# Patient Record
Sex: FEMALE | Race: WHITE | NOT HISPANIC OR LATINO | Employment: FULL TIME | URBAN - METROPOLITAN AREA
[De-identification: names, ages, dates, MRNs, and addresses within clinical notes are randomized per-mention and may not be internally consistent; named-entity substitution may affect disease eponyms.]

---

## 2017-01-18 ENCOUNTER — TELEPHONE (OUTPATIENT)
Dept: FAMILY MEDICINE | Facility: CLINIC | Age: 22
End: 2017-01-18

## 2017-01-18 DIAGNOSIS — Z30.41 ENCOUNTER FOR SURVEILLANCE OF CONTRACEPTIVE PILLS: Primary | ICD-10-CM

## 2017-01-18 NOTE — TELEPHONE ENCOUNTER
Left message for patient to return call to clinic.  Not due for a pappe until 10/2018, but could come in for a physical if wanting to keep appointment.  Can send refills to the pharmacy per protocol.  Last OV was 12/6/16.    Janis Jc RN

## 2017-01-18 NOTE — TELEPHONE ENCOUNTER
Pt calling stating she is completely out and wondering if she can get this filled today. She made an appt for a pappe next Wednesday.    norethindrone-ethinyl estradiol (NORTREL 1/35, 28,) 1-35 MG-MCG per tablet        Last Written Prescription Date: 12/06/16  Last Fill Quantity: 28, # refills: 0  Last Office Visit with [facility] or [facility] prescribing provider: 12/09/16   Next 5 appointments (look out 90 days)     Jan 25, 2017 10:40 AM   PHYSICAL with FARA Louie CNP   [facility]    [address] [address] [phone]                   BP Readings from Last 3 Encounters:   12/10/16 103/63   12/09/16 106/68   08/22/16 103/63     Date of last Breast Exam: /    Josey Parmar  [facility] [place]

## 2017-02-14 ENCOUNTER — OFFICE VISIT (OUTPATIENT)
Dept: FAMILY MEDICINE | Facility: CLINIC | Age: 22
End: 2017-02-14
Payer: COMMERCIAL

## 2017-02-14 VITALS
HEIGHT: 62 IN | SYSTOLIC BLOOD PRESSURE: 109 MMHG | BODY MASS INDEX: 22.36 KG/M2 | TEMPERATURE: 98 F | DIASTOLIC BLOOD PRESSURE: 63 MMHG | WEIGHT: 121.5 LBS | OXYGEN SATURATION: 99 % | HEART RATE: 67 BPM

## 2017-02-14 DIAGNOSIS — J01.00 ACUTE NON-RECURRENT MAXILLARY SINUSITIS: Primary | ICD-10-CM

## 2017-02-14 PROCEDURE — 99213 OFFICE O/P EST LOW 20 MIN: CPT | Performed by: NURSE PRACTITIONER

## 2017-02-14 RX ORDER — DOXYCYCLINE 100 MG/1
100 CAPSULE ORAL 2 TIMES DAILY
Qty: 20 CAPSULE | Refills: 0 | Status: SHIPPED | OUTPATIENT
Start: 2017-02-14 | End: 2017-12-14

## 2017-02-14 NOTE — PATIENT INSTRUCTIONS
Thank you for choosing [facility].  You may be receiving a survey in the mail from Jackeline Acevedo regarding your visit today.  Please take a few minutes to complete and return the survey to let us know how we are doing.      If you have questions or concerns, please contact us via Radialpoint or you can contact your care team at [phone].    Our Clinic hours are:  Monday 6:40 am  to 7:00 pm  Tuesday -Friday 6:40 am to 5:00 pm    The [place] outpatient lab hours are:  Monday - Friday 6:10 am to 4:45 pm  Saturdays 7:00 am to 11:00 am  Appointments are required, call [phone]    If you have clinical questions after hours or would like to schedule an appointment,  call the clinic at [phone].

## 2017-02-14 NOTE — MR AVS SNAPSHOT
After Visit Summary   2/14/2017    Aidee Pope    MRN: [id number]           Patient Information     Date Of Birth          1995        Visit Information        Provider Department      2/14/2017 11:20 AM Belkis Proctor APRN CNP [facility]        Today's Diagnoses     Acute non-recurrent maxillary sinusitis    -  1      Care Instructions          Thank you for choosing [facility].  You may be receiving a survey in the mail from [facility] regarding your visit today.  Please take a few minutes to complete and return the survey to let us know how we are doing.      If you have questions or concerns, please contact us via Ascenz or you can contact your care team at [phone].    Our Clinic hours are:  Monday 6:40 am  to 7:00 pm  Tuesday -Friday 6:40 am to 5:00 pm    The [place] outpatient lab hours are:  Monday - Friday 6:10 am to 4:45 pm  Saturdays 7:00 am to 11:00 am  Appointments are required, call [phone]    If you have clinical questions after hours or would like to schedule an appointment,  call the clinic at [phone].          Follow-ups after your visit        Who to contact     If you have questions or need follow up information about today's clinic visit or your schedule please contact [facility] directly at [phone].  Normal or non-critical lab and imaging results will be communicated to you by MyChart, letter or phone within 4 business days after the clinic has received the results. If you do not hear from us within 7 days, please contact the clinic through [employer] or phone. If you have a critical or abnormal lab result, we will notify you by phone as soon as possible.  Submit refill requests through Ascenz or call your pharmacy and they will forward the refill request to us. Please allow 3 business days for your refill to be completed.          Additional Information About Your Visit        MyChart Information      "[employer] lets you send messages to your doctor, view your test results, renew your prescriptions, schedule appointments and more. To sign up, go to [email or web address] . Click on \"Log in\" on the left side of the screen, which will take you to the Welcome page. Then click on \"Sign up Now\" on the right side of the page.     You will be asked to enter the access code listed below, as well as some personal information. Please follow the directions to create your username and password.     Your access code is: [email or web address]  Expires: 3/9/2017 11:02 AM     Your access code will  in 90 days. If you need help or a new code, please call your [facility] clinic or [phone].        Care EveryWhere ID     This is your Care EveryWhere ID. This could be used by other organizations to access your [facility] medical records  [id number]        Your Vitals Were     Pulse Temperature Height Last Period Pulse Oximetry Breastfeeding?    67 98  F (36.7  C) (Tympanic) 5' 2\" (1.575 m) 2017 (Approximate) 99% No    BMI (Body Mass Index)                   22.22 kg/m2            Blood Pressure from Last 3 Encounters:   17 109/63   12/10/16 103/63   16 106/68    Weight from Last 3 Encounters:   17 121 lb 8 oz (55.1 kg)   16 124 lb (56.2 kg)   16 130 lb (59 kg)              Today, you had the following     No orders found for display         Today's Medication Changes          These changes are accurate as of: 17 11:46 AM.  If you have any questions, ask your nurse or doctor.               Start taking these medicines.        Dose/Directions    doxycycline 100 MG capsule   Commonly known as:  VIBRAMYCIN   Used for:  Acute non-recurrent maxillary sinusitis   Started by:  Belkis Proctor APRN CNP        Dose:  100 mg   Take 1 capsule (100 mg) by mouth 2 times daily   Quantity:  20 capsule   Refills:  0            Where to get your medicines      These medications were sent to [facility] [place], [place] - [address]  [address] [address]     Phone:  [phone]     doxycycline 100 MG capsule                Primary Care Provider Office Phone # Fax #    Belkis Chacon FARA Leos [phone]       [facility] [address] [address]        Thank you!     Thank you for choosing [facility]  for your care. Our goal is always to provide you with excellent care. Hearing back from our patients is one way we can continue to improve our services. Please take a few minutes to complete the written survey that you may receive in the mail after your visit with us. Thank you!             Your Updated Medication List - Protect others around you: Learn how to safely use, store and throw away your medicines at www.disposemymeds.org.          This list is accurate as of: 2/14/17 11:46 AM.  Always use your most recent med list.                   Brand Name Dispense Instructions for use    doxycycline 100 MG capsule    VIBRAMYCIN    20 capsule    Take 1 capsule (100 mg) by mouth 2 times daily       FLUoxetine 20 MG capsule    PROzac    90 capsule    Take 1 capsule (20 mg) by mouth daily       norethindrone-ethinyl estradiol 1-35 MG-MCG per tablet    NORTREL 1/35 (28)    84 tablet    Take 1 tablet by mouth daily

## 2017-02-14 NOTE — LETTER
[facility]  [address] [address]  Phone: [phone]    February 14, 2017      RE: Aidee Pope  [address] [address]       To whom it may concern:    Aidee Pope was seen in our clinic today 2/14/2017. Please excuse her from work missed yesterday, Monday 2/13/17 and today, Tuesday 2/14/17. She may return to work Wednesday  2/15/2017.         Sincerely,    Belkis Proctor, LANE /cb

## 2017-02-14 NOTE — PROGRESS NOTES
"  SUBJECTIVE:                                                    Aidee Pope is a 21 year old female who presents to clinic today for the following health issues:  Chief Complaint   Patient presents with     URI     Medication Reconciliation     patient stopped taking Wellbutrin, it was causing rapid heart beat.      Health Maintenance     declined flu shot, due for chlamydia screening          ENT Symptoms             Symptoms: cc Present Absent Comment   Fever/Chills  X  chills   Fatigue  X     Muscle Aches  X  Especially in the neck area    Eye Irritation   X    Sneezing  X     Nasal Eleazar/Drg  X     Sinus Pressure/Pain  X     Loss of smell   X    Dental pain   X    Sore Throat   X    Swollen Glands   X    Ear Pain/Fullness   X    Cough  X  Dry Cough    Wheeze   X    Chest Pain   X    Shortness of breath   X    Rash   X    Other X X  Headaches , frontal, Sinus headache, sensitive to light and sound . Waxing and waning      Symptom duration:  X 2 weeks    Symptom severity:  moderate    Treatments tried:  Excedrin- helps headache short term ,dayquil,roslyn vanessa   Contacts:  co workers have had same symptoms              Problem list and histories reviewed & adjusted, as indicated.  Additional history: as documented    Problem list, Medication list, Allergies, and Medical/Social/Surgical histories reviewed in [facility] and updated as appropriate.    ROS:  Constitutional, HEENT, cardiovascular, pulmonary, gi and gu systems are negative, except as otherwise noted.    OBJECTIVE:                                                    /63 (BP Location: Left arm, Patient Position: Chair, Cuff Size: Adult Small)  Pulse 67  Temp 98  F (36.7  C) (Tympanic)  Ht 5' 2\" (1.575 m)  Wt 121 lb 8 oz (55.1 kg)  LMP 02/01/2017 (Approximate)  SpO2 99%  Breastfeeding? No  BMI 22.22 kg/m2  Body mass index is 22.22 kg/(m^2).  GENERAL: healthy, alert and no distress  HENT: ear canals and TM's normal, nose and mouth without ulcers or " lesions  NECK: no adenopathy, no asymmetry, masses, or scars and thyroid normal to palpation  RESP: lungs clear to auscultation - no rales, rhonchi or wheezes  CV: regular rate and rhythm, normal S1 S2, no S3 or S4, no murmur, click or rub, no peripheral edema and peripheral pulses strong         ASSESSMENT/PLAN:                                                        ICD-10-CM    1. Acute non-recurrent maxillary sinusitis J01.00 doxycycline (VIBRAMYCIN) 100 MG capsule BID for 10 days.  Call in 2 weeks if not improving.         The risks, benefits and treatment options of prescribed medications or other treatments have been discussed with the patient. The patient verbalized their understanding and should call or follow up if no improvement or if they develop further problems.    FARA [facility]

## 2017-02-14 NOTE — NURSING NOTE
"Chief Complaint   Patient presents with     URI     Medication Reconciliation     patient stopped taking Wellbutrin, it was causing rapid heart beat.      Health Maintenance     declined flu shot, due for chlamydia screening        Initial /63 (BP Location: Left arm, Patient Position: Chair, Cuff Size: Adult Small)  Pulse 67  Temp 98  F (36.7  C) (Tympanic)  Ht 5' 2\" (1.575 m)  Wt 121 lb 8 oz (55.1 kg)  LMP 02/01/2017 (Approximate)  SpO2 99%  Breastfeeding? No  BMI 22.22 kg/m2 Estimated body mass index is 22.22 kg/(m^2) as calculated from the following:    Height as of this encounter: 5' 2\" (1.575 m).    Weight as of this encounter: 121 lb 8 oz (55.1 kg).  Medication Reconciliation: complete    "

## 2017-05-02 ENCOUNTER — TELEPHONE (OUTPATIENT)
Dept: FAMILY MEDICINE | Facility: CLINIC | Age: 22
End: 2017-05-02

## 2017-05-02 NOTE — TELEPHONE ENCOUNTER
Reason for Call:  Other note for missing work yesterday     Detailed comments: pt calling stating she left work yesterday because she was vomiting at work and her employer is requesting a note for her absence.    Phone Number Patient can be reached at: Home number on file [phone] (home)    Best Time: any    Can we leave a detailed message on this number? YES    Call taken on 5/2/2017 at 10:21 AM by Josey Parmar

## 2017-05-02 NOTE — LETTER
[facility]  [address] [address]  Phone: [phone]    May 2, 2017      RE: Aidee MARRUFO Niko  [address] [address]       To whom it may concern:    Please excuse Aidee from work yesterday due to her reported illness.          Sincerely,        LANE Dlil

## 2017-05-02 NOTE — TELEPHONE ENCOUNTER
S-(situation): Patient requesting a note excusing her from work yesterday.    B-(background): Patient reports that she was throwing up at work yesterday so went home early.  Her employer is asking for a work excuse note due to her illness.  Patient reports that she took some zofran yesterday and was able to keep food and drink down after that.  She is feeling better today.    A-(assessment): Patient requesting a work excuse note for yesterday.    R-(recommendations): Letter started.  Patient would like to pick the letter up at the clinic if approved.  Please advise.    Janis Jc RN

## 2017-07-16 ENCOUNTER — HOSPITAL ENCOUNTER (EMERGENCY)
Facility: CLINIC | Age: 22
Discharge: HOME | End: 2017-07-16
Attending: PHYSICIAN ASSISTANT | Admitting: PHYSICIAN ASSISTANT
Payer: COMMERCIAL

## 2017-07-16 VITALS
HEIGHT: 62 IN | SYSTOLIC BLOOD PRESSURE: 116 MMHG | RESPIRATION RATE: 16 BRPM | TEMPERATURE: 98.5 F | DIASTOLIC BLOOD PRESSURE: 78 MMHG | BODY MASS INDEX: 21.71 KG/M2 | WEIGHT: 118 LBS | HEART RATE: 84 BPM | OXYGEN SATURATION: 99 %

## 2017-07-16 DIAGNOSIS — B34.9 VIRAL INFECTION: ICD-10-CM

## 2017-07-16 DIAGNOSIS — J01.01 ACUTE RECURRENT MAXILLARY SINUSITIS: ICD-10-CM

## 2017-07-16 PROCEDURE — 99213 OFFICE O/P EST LOW 20 MIN: CPT | Performed by: PHYSICIAN ASSISTANT

## 2017-07-16 PROCEDURE — 99212 OFFICE O/P EST SF 10 MIN: CPT

## 2017-07-16 NOTE — ED AVS SNAPSHOT
[facility]    [address] [address]    Phone:  [phone]    Fax:  [phone]                                       Aidee Pope   MRN: [id number]    Department:  [facility]   Date of Visit:  7/16/2017           Patient Information     Date Of Birth          1995        Your diagnoses for this visit were:     Acute recurrent maxillary sinusitis     Viral infection        You were seen by Eric Price PA-C.        Discharge Instructions         Understanding Sinus Problems    You don t often think about your sinuses until there s a problem. One day you realize you can t smell dinner cooking. Or you find you often have headaches or problems breathing through your nose.  Symptoms of sinus problems  Sinus problems can cause uncomfortable symptoms. Your nose may run constantly. You might have trouble sleeping at night. You may even lose your sense of smell. Other symptoms can include:    Nasal congestion    Fullness in ears    Green, yellow, or bloody drainage from the nose    Trouble tasting food    Frequent headaches    Facial pain    Cough  When sinuses are blocked  If something blocks the passages in the nose or sinuses, mucus can t drain. Mucus-filled sinuses often become infected.    Colds cause the lining of the nose and sinuses to swell and make extra mucus. A buildup of mucus can lead to a more serious infection.    Allergies irritate turbinates and other tissues. This causes swelling, which can cause a blockage. Over time, this irritation can also lead to sacs of swollen tissue (polyps).    Polyps may form in both the sinuses and nose. Polyps can grow large enough to clog nasal passages and block drainage.    A crooked (deviated) septum may block nasal passages. This is often the result of an injury.  Date Last Reviewed: 11/1/2016 2000-2017 [employer]. [address] [place] [address], [place] [address]. All  rights reserved. This information is not intended as a substitute for professional medical care. Always follow your healthcare professional's instructions.          24 Hour Appointment Hotline       To make an appointment at any [facility] clinic, call [phone] ([phone]). If you don't have a family doctor or clinic, we will help you find one. [facility] clinics are conveniently located to serve the needs of you and your family.             Review of your medicines      Our records show that you are taking the medicines listed below. If these are incorrect, please call your family doctor or clinic.        Dose / Directions Last dose taken    doxycycline 100 MG capsule   Commonly known as:  VIBRAMYCIN   Dose:  100 mg   Quantity:  20 capsule        Take 1 capsule (100 mg) by mouth 2 times daily   Refills:  0        FLUoxetine 20 MG capsule   Commonly known as:  PROzac   Dose:  20 mg   Quantity:  90 capsule        Take 1 capsule (20 mg) by mouth daily   Refills:  3        norethindrone-ethinyl estradiol 1-35 MG-MCG per tablet   Commonly known as:  NORTREL 1/35 (28)   Dose:  1 tablet   Quantity:  84 tablet        Take 1 tablet by mouth daily   Refills:  3                Orders Needing Specimen Collection     None      Pending Results     No orders found from 7/14/2017 to 7/17/2017.            Pending Culture Results     No orders found from 7/14/2017 to 7/17/2017.            Pending Results Instructions     If you had any lab results that were not finalized at the time of your Discharge, you can call the ED Lab Result RN at [phone]. You will be contacted by this team for any positive Lab results or changes in treatment. The nurses are available 7 days a week from 10A to 6:30P.  You can leave a message 24 hours per day and they will return your call.        Test Results From Your Hospital Stay               Thank you for choosing [facility]       Thank you for choosing [facility] for your care. Our goal is  "always to provide you with excellent care. Hearing back from our patients is one way we can continue to improve our services. Please take a few minutes to complete the written survey that you may receive in the mail after you visit with us. Thank you!        [employer] Information     [employer] lets you send messages to your doctor, view your test results, renew your prescriptions, schedule appointments and more. To sign up, go to [email or web address] . Click on \"Log in\" on the left side of the screen, which will take you to the Welcome page. Then click on \"Sign up Now\" on the right side of the page.     You will be asked to enter the access code listed below, as well as some personal information. Please follow the directions to create your username and password.     Your access code is: [email or web address]  Expires: 10/14/2017  4:46 PM     Your access code will  in 90 days. If you need help or a new code, please call your [place] clinic or [phone].        Care EveryWhere ID     This is your Care EveryWhere ID. This could be used by other organizations to access your [place] medical records  [id number]        Equal Access to Services     VESNA RUSSELL : Jess Marshall, wajared maguire, ino elena, ev gill. So [facility] [phone].    ATENCIÓN: Si habla español, tiene a ashby disposición servicios gratuitos de asistencia lingüística. Kristina al [phone].    We comply with applicable federal civil rights laws and [place] laws. We do not discriminate on the basis of race, color, national origin, age, disability sex, sexual orientation or gender identity.            After Visit Summary       This is your record. Keep this with you and show to your community pharmacist(s) and doctor(s) at your next visit.                  "

## 2017-07-16 NOTE — ED AVS SNAPSHOT
[facility]    [address] [address]    Phone:  [phone]    Fax:  [phone]                                       Aidee Pope   MRN: [id number]    Department:  [facility]   Date of Visit:  7/16/2017           After Visit Summary Signature Page     I have received my discharge instructions, and my questions have been answered. I have discussed any challenges I see with this plan with the nurse or doctor.    ..........................................................................................................................................  Patient/Patient Representative Signature      ..........................................................................................................................................  Patient Representative Print Name and Relationship to Patient    ..................................................               ................................................  Date                                            Time    ..........................................................................................................................................  Reviewed by Signature/Title    ...................................................              ..............................................  Date                                                            Time

## 2017-07-16 NOTE — DISCHARGE INSTRUCTIONS
Understanding Sinus Problems    You don t often think about your sinuses until there s a problem. One day you realize you can t smell dinner cooking. Or you find you often have headaches or problems breathing through your nose.  Symptoms of sinus problems  Sinus problems can cause uncomfortable symptoms. Your nose may run constantly. You might have trouble sleeping at night. You may even lose your sense of smell. Other symptoms can include:    Nasal congestion    Fullness in ears    Green, yellow, or bloody drainage from the nose    Trouble tasting food    Frequent headaches    Facial pain    Cough  When sinuses are blocked  If something blocks the passages in the nose or sinuses, mucus can t drain. Mucus-filled sinuses often become infected.    Colds cause the lining of the nose and sinuses to swell and make extra mucus. A buildup of mucus can lead to a more serious infection.    Allergies irritate turbinates and other tissues. This causes swelling, which can cause a blockage. Over time, this irritation can also lead to sacs of swollen tissue (polyps).    Polyps may form in both the sinuses and nose. Polyps can grow large enough to clog nasal passages and block drainage.    A crooked (deviated) septum may block nasal passages. This is often the result of an injury.  Date Last Reviewed: 11/1/2016 2000-2017 [employer]. [address] [place] [address], [place] [address]. All rights reserved. This information is not intended as a substitute for professional medical care. Always follow your healthcare professional's instructions.

## 2017-07-16 NOTE — ED PROVIDER NOTES
"Chief Complaint: Sinus pain    HPI: [id number] presents with complaints of nasal congestion for 2 days Over-the-counter medications have been unable to provide relief. Other symptoms include congestion and cough.  She denies any fever, or headache.  No maxillary sinus pain     ROS: 10 point review of systems was completed and was negative unless noted in HPI above.     Vital signs noted and reviewed by Eric Price  /78  Pulse 84  Temp 98.5  F (36.9  C) (Oral)  Resp 16  Ht 1.575 m (5' 2\")  Wt 53.5 kg (118 lb)  LMP 07/03/2017  SpO2 99%  BMI 21.58 kg/m2    Physical Exam:    General appearance: healthy, alert and no distress  Ears: R TM - normal: no effusions, no erythema, and normal landmarks, L TM - normal: no effusions, no erythema, and normal landmarks  Eyes: R normal, L normal  Nose: clear discharge and mucosal edema  Sinus: No tenderness to percussion  Oropharynx: mild erythema  Neck: supple and no adenopathy  Lungs: normal and clear to auscultation  Heart: S1, S2 normal, no murmur, click, rub or gallop, regular rate and rhythm          A     1. Acute recurrent maxillary sinusitis    2. Viral infection         P     Symptomatic treatment consisting of oral and nasal decongestants, adequate hydration, increase the humidity in the home, OTC analgesia, local compresses, nasal vapor and nasal saline, head of bed elevation, tobacco avoidance, and relative rest.     The patient is instructed to call if symptoms worsen or fail to  resolve within a week.     Eric Price  7/16/2017, 4:36 PM       Eric Price PA-C  07/16/17 1647    "

## 2017-12-14 ENCOUNTER — HOSPITAL ENCOUNTER (EMERGENCY)
Facility: CLINIC | Age: 22
Discharge: HOME | End: 2017-12-14
Attending: NURSE PRACTITIONER | Admitting: NURSE PRACTITIONER
Payer: COMMERCIAL

## 2017-12-14 VITALS
DIASTOLIC BLOOD PRESSURE: 70 MMHG | TEMPERATURE: 98.1 F | SYSTOLIC BLOOD PRESSURE: 109 MMHG | OXYGEN SATURATION: 99 % | RESPIRATION RATE: 16 BRPM

## 2017-12-14 DIAGNOSIS — R11.2 NON-INTRACTABLE VOMITING WITH NAUSEA, UNSPECIFIED VOMITING TYPE: ICD-10-CM

## 2017-12-14 LAB
ALBUMIN SERPL-MCNC: 3.8 G/DL (ref 3.4–5)
ALBUMIN UR-MCNC: NEGATIVE MG/DL
ALP SERPL-CCNC: 40 U/L (ref 40–150)
ALT SERPL W P-5'-P-CCNC: 23 U/L (ref 0–50)
ANION GAP SERPL CALCULATED.3IONS-SCNC: 7 MMOL/L (ref 3–14)
APPEARANCE UR: CLEAR
AST SERPL W P-5'-P-CCNC: 24 U/L (ref 0–45)
BASOPHILS # BLD AUTO: 0 10E9/L (ref 0–0.2)
BASOPHILS NFR BLD AUTO: 0.1 %
BILIRUB SERPL-MCNC: 0.6 MG/DL (ref 0.2–1.3)
BILIRUB UR QL STRIP: NEGATIVE
BUN SERPL-MCNC: 12 MG/DL (ref 7–30)
CALCIUM SERPL-MCNC: 8.4 MG/DL (ref 8.5–10.1)
CHLORIDE SERPL-SCNC: 103 MMOL/L (ref 94–109)
CO2 SERPL-SCNC: 28 MMOL/L (ref 20–32)
COLOR UR AUTO: YELLOW
CREAT SERPL-MCNC: 0.62 MG/DL (ref 0.52–1.04)
DIFFERENTIAL METHOD BLD: NORMAL
EOSINOPHIL # BLD AUTO: 0 10E9/L (ref 0–0.7)
EOSINOPHIL NFR BLD AUTO: 0.2 %
ERYTHROCYTE [DISTWIDTH] IN BLOOD BY AUTOMATED COUNT: 11.9 % (ref 10–15)
GFR SERPL CREATININE-BSD FRML MDRD: >90 ML/MIN/1.7M2
GLUCOSE SERPL-MCNC: 95 MG/DL (ref 70–99)
GLUCOSE UR STRIP-MCNC: NEGATIVE MG/DL
HCG UR QL: NEGATIVE
HCT VFR BLD AUTO: 39.9 % (ref 35–47)
HGB BLD-MCNC: 13.4 G/DL (ref 11.7–15.7)
HGB UR QL STRIP: NEGATIVE
IMM GRANULOCYTES # BLD: 0 10E9/L (ref 0–0.4)
IMM GRANULOCYTES NFR BLD: 0.1 %
KETONES UR STRIP-MCNC: NEGATIVE MG/DL
LEUKOCYTE ESTERASE UR QL STRIP: NEGATIVE
LYMPHOCYTES # BLD AUTO: 2.4 10E9/L (ref 0.8–5.3)
LYMPHOCYTES NFR BLD AUTO: 28.9 %
MCH RBC QN AUTO: 31.1 PG (ref 26.5–33)
MCHC RBC AUTO-ENTMCNC: 33.6 G/DL (ref 31.5–36.5)
MCV RBC AUTO: 93 FL (ref 78–100)
MONOCYTES # BLD AUTO: 0.3 10E9/L (ref 0–1.3)
MONOCYTES NFR BLD AUTO: 4.1 %
MUCOUS THREADS #/AREA URNS LPF: PRESENT /LPF
NEUTROPHILS # BLD AUTO: 5.4 10E9/L (ref 1.6–8.3)
NEUTROPHILS NFR BLD AUTO: 66.6 %
NITRATE UR QL: NEGATIVE
PH UR STRIP: 8 PH (ref 5–7)
PLATELET # BLD AUTO: 342 10E9/L (ref 150–450)
POTASSIUM SERPL-SCNC: 3.9 MMOL/L (ref 3.4–5.3)
PROT SERPL-MCNC: 7.7 G/DL (ref 6.8–8.8)
RBC # BLD AUTO: 4.31 10E12/L (ref 3.8–5.2)
RBC #/AREA URNS AUTO: 1 /HPF (ref 0–2)
SODIUM SERPL-SCNC: 138 MMOL/L (ref 133–144)
SOURCE: ABNORMAL
SP GR UR STRIP: 1.01 (ref 1–1.03)
SQUAMOUS #/AREA URNS AUTO: 1 /HPF (ref 0–1)
UROBILINOGEN UR STRIP-MCNC: NORMAL MG/DL (ref 0–2)
WBC # BLD AUTO: 8.1 10E9/L (ref 4–11)
WBC #/AREA URNS AUTO: 0 /HPF (ref 0–2)

## 2017-12-14 PROCEDURE — 25000128 H RX IP 250 OP 636: Performed by: NURSE PRACTITIONER

## 2017-12-14 PROCEDURE — 96374 THER/PROPH/DIAG INJ IV PUSH: CPT | Performed by: NURSE PRACTITIONER

## 2017-12-14 PROCEDURE — 99284 EMERGENCY DEPT VISIT MOD MDM: CPT | Mod: 25 | Performed by: NURSE PRACTITIONER

## 2017-12-14 PROCEDURE — 99284 EMERGENCY DEPT VISIT MOD MDM: CPT | Mod: Z6 | Performed by: NURSE PRACTITIONER

## 2017-12-14 PROCEDURE — 81025 URINE PREGNANCY TEST: CPT | Performed by: NURSE PRACTITIONER

## 2017-12-14 PROCEDURE — 81001 URINALYSIS AUTO W/SCOPE: CPT | Performed by: NURSE PRACTITIONER

## 2017-12-14 PROCEDURE — 96361 HYDRATE IV INFUSION ADD-ON: CPT | Performed by: NURSE PRACTITIONER

## 2017-12-14 PROCEDURE — 80053 COMPREHEN METABOLIC PANEL: CPT | Performed by: NURSE PRACTITIONER

## 2017-12-14 PROCEDURE — 85025 COMPLETE CBC W/AUTO DIFF WBC: CPT | Performed by: NURSE PRACTITIONER

## 2017-12-14 RX ORDER — ONDANSETRON 4 MG/1
4 TABLET, ORALLY DISINTEGRATING ORAL EVERY 8 HOURS PRN
Qty: 10 TABLET | Refills: 0 | Status: SHIPPED | OUTPATIENT
Start: 2017-12-14 | End: 2017-12-17

## 2017-12-14 RX ORDER — ONDANSETRON 2 MG/ML
4 INJECTION INTRAMUSCULAR; INTRAVENOUS EVERY 30 MIN PRN
Status: DISCONTINUED | OUTPATIENT
Start: 2017-12-14 | End: 2017-12-14 | Stop reason: HOSPADM

## 2017-12-14 RX ORDER — GUAIFENESIN 600 MG/1
600 TABLET, EXTENDED RELEASE ORAL 2 TIMES DAILY
COMMUNITY
End: 2018-09-28

## 2017-12-14 RX ADMIN — SODIUM CHLORIDE 1000 ML: 9 INJECTION, SOLUTION INTRAVENOUS at 12:13

## 2017-12-14 RX ADMIN — ONDANSETRON 4 MG: 2 INJECTION INTRAMUSCULAR; INTRAVENOUS at 12:13

## 2017-12-14 ASSESSMENT — ENCOUNTER SYMPTOMS
VOMITING: 1
ABDOMINAL PAIN: 0
FEVER: 1
FREQUENCY: 1
MYALGIAS: 0
DIARRHEA: 0
APPETITE CHANGE: 1
NAUSEA: 1
DYSURIA: 0
FATIGUE: 1
COUGH: 0
HEADACHES: 1
SHORTNESS OF BREATH: 0
CONSTIPATION: 0
BACK PAIN: 0
DIZZINESS: 0
BLOOD IN STOOL: 0
LIGHT-HEADEDNESS: 1
SORE THROAT: 0
CHILLS: 1
WEAKNESS: 0

## 2017-12-14 NOTE — ED AVS SNAPSHOT
[facility] Emergency Department    [address] [address]    Phone:  [phone]    Fax:  [phone]                                       Aidee Pope   MRN: [id number]    Department:  [facility] Emergency Department   Date of Visit:  12/14/2017           Patient Information     Date Of Birth          1995        Your diagnoses for this visit were:     Non-intractable vomiting with nausea, unspecified vomiting type        You were seen by Davida Rain APRN CNP.      Follow-up Information     Follow up with [facility] Emergency Department.    Specialty:  EMERGENCY MEDICINE    Why:  If symptoms worsen    Contact information:    [address] [place] [id number] [phone]    Additional information:    The medical center is located at   [address] [place] [address] (between [address]   [address] in [place], [place] miles north   of [place]).        Follow up with Belkis Proctor APRN CNP.    Specialty:  Nurse Practitioner    Why:  As needed    Contact information:    [address] [place] [address]  [phone]          Discharge Instructions       Zofran 4 mg every 8  Hours as needed for nausea/vomiting.  Return to the emergency department for fever, abdominal pain, unable to keep fluids down, or worse in any way.    24 Hour Appointment Hotline       To make an appointment at any [facility] clinic, call [phone] ([phone]). If you don't have a family doctor or clinic, we will help you find one. [facility] clinics are conveniently located to serve the needs of you and your family.             Review of your medicines      START taking        Dose / Directions Last dose taken    ondansetron 4 MG ODT tab   Commonly known as:  ZOFRAN ODT   Dose:  4 mg   Quantity:  10 tablet        Take 1 tablet (4 mg) by mouth every 8 hours as needed for nausea   Refills:  0          Our records show that you are taking the medicines listed  below. If these are incorrect, please call your family doctor or clinic.        Dose / Directions Last dose taken    FLUoxetine 20 MG capsule   Commonly known as:  PROzac   Dose:  20 mg   Quantity:  90 capsule        Take 1 capsule (20 mg) by mouth daily   Refills:  3        guaiFENesin 600 MG 12 hr tablet   Commonly known as:  MUCINEX   Dose:  600 mg        Take 600 mg by mouth 2 times daily   Refills:  0        norethindrone-ethinyl estradiol 1-35 MG-MCG per tablet   Commonly known as:  NORTREL 1/35 (28)   Dose:  1 tablet   Quantity:  84 tablet        Take 1 tablet by mouth daily   Refills:  3        SUDAFED PO   Dose:  30 mg        Take 30 mg by mouth every 6 hours as needed for congestion   Refills:  0                Prescriptions were sent or printed at these locations (1 Prescription)                   [facility] [address]   [address] [address]    Telephone:  [phone]   Fax:  [phone]   Hours:                  E-Prescribed (1 of 1)         ondansetron (ZOFRAN ODT) 4 MG ODT tab                Procedures and tests performed during your visit     CBC with platelets differential    Comprehensive metabolic panel    HCG qualitative urine (UPT)    UA with Microscopic      Orders Needing Specimen Collection     None      Pending Results     No orders found from 12/12/2017 to 12/15/2017.            Pending Culture Results     No orders found from 12/12/2017 to 12/15/2017.            Pending Results Instructions     If you had any lab results that were not finalized at the time of your Discharge, you can call the ED Lab Result RN at [phone]. You will be contacted by this team for any positive Lab results or changes in treatment. The nurses are available 7 days a week from 10A to 6:30P.  You can leave a message 24 hours per day and they will return your call.        Test Results From Your Hospital Stay        12/14/2017 12:41 PM      Component Results      Component Value Ref Range & Units Status    WBC 8.1 4.0 - 11.0 10e9/L Final    RBC Count 4.31 3.8 - 5.2 10e12/L Final    Hemoglobin 13.4 11.7 - 15.7 g/dL Final    Hematocrit 39.9 35.0 - 47.0 % Final    MCV 93 78 - 100 fl Final    MCH 31.1 26.5 - 33.0 pg Final    MCHC 33.6 31.5 - 36.5 g/dL Final    RDW 11.9 10.0 - 15.0 % Final    Platelet Count 342 150 - 450 10e9/L Final    Diff Method Automated Method  Final    % Neutrophils 66.6 % Final    % Lymphocytes 28.9 % Final    % Monocytes 4.1 % Final    % Eosinophils 0.2 % Final    % Basophils 0.1 % Final    % Immature Granulocytes 0.1 % Final    Absolute Neutrophil 5.4 1.6 - 8.3 10e9/L Final    Absolute Lymphocytes 2.4 0.8 - 5.3 10e9/L Final    Absolute Monocytes 0.3 0.0 - 1.3 10e9/L Final    Absolute Eosinophils 0.0 0.0 - 0.7 10e9/L Final    Absolute Basophils 0.0 0.0 - 0.2 10e9/L Final    Abs Immature Granulocytes 0.0 0 - 0.4 10e9/L Final         12/14/2017 12:58 PM      Component Results     Component Value Ref Range & Units Status    Sodium 138 133 - 144 mmol/L Final    Potassium 3.9 3.4 - 5.3 mmol/L Final    Chloride 103 94 - 109 mmol/L Final    Carbon Dioxide 28 20 - 32 mmol/L Final    Anion Gap 7 3 - 14 mmol/L Final    Glucose 95 70 - 99 mg/dL Final    Urea Nitrogen 12 7 - 30 mg/dL Final    Creatinine 0.62 0.52 - 1.04 mg/dL Final    GFR Estimate >90 >60 mL/min/1.7m2 Final    Non  GFR Calc    GFR Estimate If Black >90 >60 mL/min/1.7m2 Final    African American GFR Calc    Calcium 8.4 (L) 8.5 - 10.1 mg/dL Final    Bilirubin Total 0.6 0.2 - 1.3 mg/dL Final    Albumin 3.8 3.4 - 5.0 g/dL Final    Protein Total 7.7 6.8 - 8.8 g/dL Final    Alkaline Phosphatase 40 40 - 150 U/L Final    ALT 23 0 - 50 U/L Final    AST 24 0 - 45 U/L Final         12/14/2017 12:40 PM      Component Results     Component Value Ref Range & Units Status    Color Urine Yellow  Final    Appearance Urine Clear  Final    Glucose Urine Negative NEG^Negative mg/dL Final    Bilirubin  "Urine Negative NEG^Negative Final    Ketones Urine Negative NEG^Negative mg/dL Final    Specific Gravity Urine 1.015 1.003 - 1.035 Final    Blood Urine Negative NEG^Negative Final    pH Urine 8.0 (H) 5.0 - 7.0 pH Final    Protein Albumin Urine Negative NEG^Negative mg/dL Final    Urobilinogen mg/dL Normal 0.0 - 2.0 mg/dL Final    Nitrite Urine Negative NEG^Negative Final    Leukocyte Esterase Urine Negative NEG^Negative Final    Source Midstream Urine  Final    WBC Urine 0 0 - 2 /HPF Final    RBC Urine 1 0 - 2 /HPF Final    Squamous Epithelial /HPF Urine 1 0 - 1 /HPF Final    Mucous Urine Present (A) NEG^Negative /LPF Final         12/14/2017 12:52 PM      Component Results     Component Value Ref Range & Units Status    HCG Qual Urine Negative NEG^Negative Final    This test is for screening purposes.  Results should be interpreted along with   the clinical picture.  Confirmation testing is available if warranted by   ordering [id number], HCG Quantitative Pregnancy.                  Thank you for choosing [facility]       Thank you for choosing [facility] for your care. Our goal is always to provide you with excellent care. Hearing back from our patients is one way we can continue to improve our services. Please take a few minutes to complete the written survey that you may receive in the mail after you visit with us. Thank you!        [facility] [employer] Information     [employer] [employer] lets you send messages to your doctor, view your test results, renew your prescriptions, schedule appointments and more. To sign up, go to [email or web address] . Click on \"Log in\" on the left side of the screen, which will take you to the Welcome page. Then click on \"Sign up Now\" on the right side of the page.     You will be asked to enter the access code listed below, as well as some personal information. Please follow the directions to create your username and password.     Your access code is: [email or web address]  Expires: 3/14/2018  1:12 PM     Your access " code will  in 90 days. If you need help or a new code, please call your [facility] clinic or [phone].        Care EveryWhere ID     This is your Care EveryWhere ID. This could be used by other organizations to access your [facility] medical records  [id number]        Equal Access to Services     VESNA RUSSELL : Jess alcantaro Soarvind, waaxda luqadaha, qaybta kaalmada ulices, ev gill. So [facility] [phone].    ATENCIÓN: Si habla español, tiene a ashby disposición servicios gratuitos de asistencia lingüística. Llame al [phone].    We comply with applicable federal civil rights laws and [place] laws. We do not discriminate on the basis of race, color, national origin, age, disability, sex, sexual orientation, or gender identity.            After Visit Summary       This is your record. Keep this with you and show to your community pharmacist(s) and doctor(s) at your next visit.

## 2017-12-14 NOTE — LETTER
[facility] EMERGENCY DEPARTMENT  [address] [address]  [phone]          December 14, 2017    RE:  Aidee MARRUFO Pope                                                                                                                                                       [address] [address] [facility] [address]          To whom it may concern:    Please excuse Aidee Pope from work today.  This patient was under my professional care in the emergency department for illness today.      Sincerely,         FARA Montgomery CNP

## 2017-12-14 NOTE — ED AVS SNAPSHOT
[facility] Emergency Department    [address] [address]    Phone:  [phone]    Fax:  [phone]                                       Aidee Pope   MRN: [id number]    Department:  [facility] Emergency Department   Date of Visit:  12/14/2017           After Visit Summary Signature Page     I have received my discharge instructions, and my questions have been answered. I have discussed any challenges I see with this plan with the nurse or doctor.    ..........................................................................................................................................  Patient/Patient Representative Signature      ..........................................................................................................................................  Patient Representative Print Name and Relationship to Patient    ..................................................               ................................................  Date                                            Time    ..........................................................................................................................................  Reviewed by Signature/Title    ...................................................              ..............................................  Date                                                            Time

## 2017-12-14 NOTE — DISCHARGE INSTRUCTIONS
Zofran 4 mg every 8  Hours as needed for nausea/vomiting.  Return to the emergency department for fever, abdominal pain, unable to keep fluids down, or worse in any way.

## 2017-12-14 NOTE — ED PROVIDER NOTES
"  History     Chief Complaint   Patient presents with     Nausea & Vomiting     Nausea and vomiting all night, no emesis since 0500.  Pt needs note for work.     HPI  Aidee Pope is a 22 year old female who presents to the emergency department for evaluation of nausea and vomiting.  Symptoms started at 3 AM during the night.  Unable to keep fluids down.  Low-grade fever and chills.  Denies abdominal pain with the exception of feeling \"upset stomach\".  Patient states the main reason she presented to the emergency department today was to obtain a work note stating she is ill.  Additionally complains of sinus congestion for the last 2 weeks, but states this is much improved.  Denies diarrhea or constipation.  Denies hematemesis.  Denies black or bloody stool.    Problem List:    Patient Active Problem List    Diagnosis Date Noted     Chronic tension-type headache, not intractable 12/09/2016     Priority: Medium     Generalized anxiety disorder 07/18/2011     Priority: Medium     Diagnosis updated by automated process. Provider to review and confirm.       Moderate major depression (H) 07/18/2011     Priority: Medium        Past Medical History:    No past medical history on file.    Past Surgical History:    No past surgical history on file.    Family History:    Family History   Problem Relation Age of Onset     Psychotic Disorder Father        Social History:  Marital Status:  Single [1]  Social History   Substance Use Topics     Smoking status: Never Smoker     Smokeless tobacco: Never Used      Comment: EXPOSED AT HOME     Alcohol use No        Medications:      guaiFENesin (MUCINEX) 600 MG 12 hr tablet   Pseudoephedrine HCl (SUDAFED PO)   ondansetron (ZOFRAN ODT) 4 MG ODT tab   norethindrone-ethinyl estradiol (NORTREL 1/35, 28,) 1-35 MG-MCG per tablet   FLUoxetine (PROZAC) 20 MG capsule         Review of Systems   Constitutional: Positive for appetite change, chills, fatigue and fever.   HENT: Positive for " congestion. Negative for ear pain and sore throat.    Respiratory: Negative for cough and shortness of breath.    Cardiovascular: Negative for chest pain.   Gastrointestinal: Positive for nausea and vomiting. Negative for abdominal pain, blood in stool, constipation and diarrhea.   Genitourinary: Positive for frequency. Negative for dysuria and urgency.   Musculoskeletal: Negative for back pain and myalgias.   Skin: Negative for rash.   Neurological: Positive for light-headedness and headaches. Negative for dizziness and weakness.    All other systems were reviewed and are negative.      Physical Exam   BP: 109/70  Heart Rate: 93  Temp: 98.1  F (36.7  C)  Resp: 16  SpO2: 99 %      Physical Exam   Constitutional: She is oriented to person, place, and time. She appears well-developed and well-nourished. No distress.   HENT:   Head: Normocephalic and atraumatic.   Right Ear: External ear normal.   Left Ear: External ear normal.   Nose: Nose normal.   Mouth/Throat: Oropharynx is clear and moist.   Eyes: Conjunctivae and EOM are normal.   Neck: Normal range of motion. Neck supple.   Cardiovascular: Normal rate, regular rhythm and normal heart sounds.    No murmur heard.  Pulmonary/Chest: Effort normal and breath sounds normal. No respiratory distress.   Abdominal: Soft. Bowel sounds are normal. She exhibits no distension. There is no tenderness.   Musculoskeletal: Normal range of motion.   Neurological: She is alert and oriented to person, place, and time.   Skin: Skin is warm and dry.       ED Course     ED Course     Procedures              Results for orders placed or performed during the hospital encounter of 12/14/17 (from the past 24 hour(s))   UA with Microscopic   Result Value Ref Range    Color Urine Yellow     Appearance Urine Clear     Glucose Urine Negative NEG^Negative mg/dL    Bilirubin Urine Negative NEG^Negative    Ketones Urine Negative NEG^Negative mg/dL    Specific Gravity Urine 1.015 1.003 - 1.035     Blood Urine Negative NEG^Negative    pH Urine 8.0 (H) 5.0 - 7.0 pH    Protein Albumin Urine Negative NEG^Negative mg/dL    Urobilinogen mg/dL Normal 0.0 - 2.0 mg/dL    Nitrite Urine Negative NEG^Negative    Leukocyte Esterase Urine Negative NEG^Negative    Source Midstream Urine     WBC Urine 0 0 - 2 /HPF    RBC Urine 1 0 - 2 /HPF    Squamous Epithelial /HPF Urine 1 0 - 1 /HPF    Mucous Urine Present (A) NEG^Negative /LPF   HCG qualitative urine (UPT)   Result Value Ref Range    HCG Qual Urine Negative NEG^Negative   CBC with platelets differential   Result Value Ref Range    WBC 8.1 4.0 - 11.0 10e9/L    RBC Count 4.31 3.8 - 5.2 10e12/L    Hemoglobin 13.4 11.7 - 15.7 g/dL    Hematocrit 39.9 35.0 - 47.0 %    MCV 93 78 - 100 fl    MCH 31.1 26.5 - 33.0 pg    MCHC 33.6 31.5 - 36.5 g/dL    RDW 11.9 10.0 - 15.0 %    Platelet Count 342 150 - 450 10e9/L    Diff Method Automated Method     % Neutrophils 66.6 %    % Lymphocytes 28.9 %    % Monocytes 4.1 %    % Eosinophils 0.2 %    % Basophils 0.1 %    % Immature Granulocytes 0.1 %    Absolute Neutrophil 5.4 1.6 - 8.3 10e9/L    Absolute Lymphocytes 2.4 0.8 - 5.3 10e9/L    Absolute Monocytes 0.3 0.0 - 1.3 10e9/L    Absolute Eosinophils 0.0 0.0 - 0.7 10e9/L    Absolute Basophils 0.0 0.0 - 0.2 10e9/L    Abs Immature Granulocytes 0.0 0 - 0.4 10e9/L   Comprehensive metabolic panel   Result Value Ref Range    Sodium 138 133 - 144 mmol/L    Potassium 3.9 3.4 - 5.3 mmol/L    Chloride 103 94 - 109 mmol/L    Carbon Dioxide 28 20 - 32 mmol/L    Anion Gap 7 3 - 14 mmol/L    Glucose 95 70 - 99 mg/dL    Urea Nitrogen 12 7 - 30 mg/dL    Creatinine 0.62 0.52 - 1.04 mg/dL    GFR Estimate >90 >60 mL/min/1.7m2    GFR Estimate If Black >90 >60 mL/min/1.7m2    Calcium 8.4 (L) 8.5 - 10.1 mg/dL    Bilirubin Total 0.6 0.2 - 1.3 mg/dL    Albumin 3.8 3.4 - 5.0 g/dL    Protein Total 7.7 6.8 - 8.8 g/dL    Alkaline Phosphatase 40 40 - 150 U/L    ALT 23 0 - 50 U/L    AST 24 0 - 45 U/L         Assessments &  "Plan (with Medical Decision Making)     Aidee Pope is a 22 year old female who presents to the emergency department for evaluation of nausea and vomiting.  Symptoms started at 3 AM during the night.  Unable to keep fluids down.  Low-grade fever and chills.  Denies abdominal pain with the exception of feeling \"upset stomach\".  Patient states the main reason she presented to the emergency department today was to obtain a work note stating she is ill.  Additionally complains of sinus congestion for the last 2 weeks, but states this is much improved.  Denies diarrhea or constipation.  Denies hematemesis.  Denies black or bloody stool.    On exam patient is alert, oriented and no acute distress. Lung sounds CTA. No significant abdominal tenderness. CBC normal. Comprehensive panel is is normal. Urinalysis is normal. UPT is negative. Patient was given IV NS 1 liter bolus and Zofran 4mg IV. Patient able to tolerate ice chips and sips of water. Patient feels well enough to discharge home. Prescription for a small number of Zofran sent to pharmacy for nausea. Worrisome reasons to return discussed (fever, abdominal pain, unable to keep fluids down, or worse in any way).    I have reviewed the nursing notes.    I have reviewed the findings, diagnosis, plan and need for follow up with the patient.    New Prescriptions    ONDANSETRON (ZOFRAN ODT) 4 MG ODT TAB    Take 1 tablet (4 mg) by mouth every 8 hours as needed for nausea       Final diagnoses:   Non-intractable vomiting with nausea, unspecified vomiting type       12/14/2017   [facility] EMERGENCY DEPARTMENT     Davida Rain APRN CNP  12/14/17 1336    "

## 2018-01-02 DIAGNOSIS — F41.1 GENERALIZED ANXIETY DISORDER: ICD-10-CM

## 2018-01-02 DIAGNOSIS — F33.1 MAJOR DEPRESSIVE DISORDER, RECURRENT EPISODE, MODERATE (H): ICD-10-CM

## 2018-01-03 NOTE — TELEPHONE ENCOUNTER
Requested Prescriptions   Pending Prescriptions Disp Refills     FLUoxetine (PROZAC) 20 MG capsule [Pharmacy Med Name: FLUOXETINE HCL 20 MG CAPSULE]  Last Written Prescription Date:  12/09/2016  Last Fill Quantity: 90,  # refills: 3   Last Office Visit with [facility], P or [facility] prescribing provider:  02/14/2017   Future Office Visit:      90 capsule 2     Sig: TAKE 1 CAPSULE (20 MG) BY MOUTH DAILY    SSRIs Protocol Failed    1/2/2018 10:29 AM       Failed - PHQ-9 score less than 5 in past 6 months    The PHQ-9 criteria is meant to fail. It requires a PHQ-9 score review  PHQ-9 SCORE 4/6/2016 8/22/2016 12/9/2016   Total Score - - -   Total Score 2 8 5     JERRY-7 SCORE 6/8/2015 8/22/2016 12/9/2016   Total Score 1 - -   Total Score - 4 0              Failed - Recent (6 mo) or future visit with authorizing provider's specialty    Patient had office visit in the last 6 months or has a visit in the next 30 days with authorizing provider.  See chart review.            Passed - Patient is age 18 or older       Passed - No active pregnancy on record       Passed - No positive pregnancy test in last 12 months        Shawn RODRIGUEZ (R)

## 2018-01-26 DIAGNOSIS — Z30.41 ENCOUNTER FOR SURVEILLANCE OF CONTRACEPTIVE PILLS: ICD-10-CM

## 2018-01-26 NOTE — TELEPHONE ENCOUNTER
"Requested Prescriptions   Pending Prescriptions Disp Refills     ALAYCEN 1/35 1-35 MG-MCG per tablet [Pharmacy Med Name: ALYACEN 1-35-28 TABLET]  Last Written Prescription Date:  01/18/17  Last Fill Quantity: 84,  # refills: 3   Last Office Visit with [facility] provider:  02/14/17   Future Office Visit:      84 tablet 3     Sig: TAKE 1 TABLET BY MOUTH DAILY    Contraceptives Protocol Passed    1/26/2018 12:58 AM       Passed - Patient is not a current smoker if age is 35 or older       Passed - Recent or future visit with authorizing provider's specialty    Patient had office visit in the last year or has a visit in the next 30 days with authorizing provider.  See \"Patient Info\" tab in inbasket, or \"Choose Columns\" in Meds & Orders section of the refill encounter.        Passed - No active pregnancy on record       Passed - No positive pregnancy test in past 12 months          "

## 2018-02-11 ENCOUNTER — HOSPITAL ENCOUNTER (EMERGENCY)
Facility: CLINIC | Age: 23
Discharge: HOME | End: 2018-02-11
Attending: FAMILY MEDICINE | Admitting: FAMILY MEDICINE
Payer: COMMERCIAL

## 2018-02-11 ENCOUNTER — APPOINTMENT (OUTPATIENT)
Dept: ULTRASOUND IMAGING | Facility: CLINIC | Age: 23
End: 2018-02-11
Attending: FAMILY MEDICINE
Payer: COMMERCIAL

## 2018-02-11 VITALS
OXYGEN SATURATION: 100 % | BODY MASS INDEX: 21.95 KG/M2 | HEART RATE: 77 BPM | DIASTOLIC BLOOD PRESSURE: 76 MMHG | SYSTOLIC BLOOD PRESSURE: 130 MMHG | RESPIRATION RATE: 14 BRPM | TEMPERATURE: 98.1 F | WEIGHT: 120 LBS

## 2018-02-11 DIAGNOSIS — R10.31 RLQ ABDOMINAL PAIN: ICD-10-CM

## 2018-02-11 LAB
ALBUMIN UR-MCNC: NEGATIVE MG/DL
AMORPH CRY #/AREA URNS HPF: ABNORMAL /HPF
APPEARANCE UR: ABNORMAL
BILIRUB UR QL STRIP: NEGATIVE
COLOR UR AUTO: YELLOW
GLUCOSE UR STRIP-MCNC: NEGATIVE MG/DL
HCG UR QL: NEGATIVE
HGB UR QL STRIP: NEGATIVE
KETONES UR STRIP-MCNC: NEGATIVE MG/DL
LEUKOCYTE ESTERASE UR QL STRIP: NEGATIVE
MUCOUS THREADS #/AREA URNS LPF: PRESENT /LPF
NITRATE UR QL: NEGATIVE
PH UR STRIP: 7 PH (ref 5–7)
RBC #/AREA URNS AUTO: 1 /HPF (ref 0–2)
SOURCE: ABNORMAL
SP GR UR STRIP: 1.02 (ref 1–1.03)
SQUAMOUS #/AREA URNS AUTO: <1 /HPF (ref 0–1)
UROBILINOGEN UR STRIP-MCNC: 0 MG/DL (ref 0–2)
WBC #/AREA URNS AUTO: 3 /HPF (ref 0–2)

## 2018-02-11 PROCEDURE — 76830 TRANSVAGINAL US NON-OB: CPT

## 2018-02-11 PROCEDURE — 81025 URINE PREGNANCY TEST: CPT | Performed by: FAMILY MEDICINE

## 2018-02-11 PROCEDURE — 99284 EMERGENCY DEPT VISIT MOD MDM: CPT | Mod: 25 | Performed by: FAMILY MEDICINE

## 2018-02-11 PROCEDURE — 81001 URINALYSIS AUTO W/SCOPE: CPT | Performed by: FAMILY MEDICINE

## 2018-02-11 PROCEDURE — 99284 EMERGENCY DEPT VISIT MOD MDM: CPT | Mod: Z6 | Performed by: FAMILY MEDICINE

## 2018-02-11 NOTE — ED AVS SNAPSHOT
[facility] Emergency Department    [address] [address]    Phone:  [phone]    Fax:  [phone]                                       Aidee Pope   MRN: [id number]    Department:  [facility] Emergency Department   Date of Visit:  2/11/2018           Patient Information     Date Of Birth          1995        Your diagnoses for this visit were:     RLQ abdominal pain        You were seen by Tank Ochoa MD.        Discharge Instructions       Return to be seen if symptoms do not resolve, worsen, fevers develop, vomiting, or other new concerning symptoms.  Try bowel cleansing with 2 tablespoons of milk of magnesia.    24 Hour Appointment Hotline       To make an appointment at any [facility] clinic, call [phone] ([phone]). If you don't have a family doctor or clinic, we will help you find one. [facility] clinics are conveniently located to serve the needs of you and your family.             Review of your medicines      Our records show that you are taking the medicines listed below. If these are incorrect, please call your family doctor or clinic.        Dose / Directions Last dose taken    FLUoxetine 20 MG capsule   Commonly known as:  PROzac   Dose:  20 mg   Quantity:  30 capsule        Take 1 capsule (20 mg) by mouth daily (Needs follow-up appointment for this medication)   Refills:  0        guaiFENesin 600 MG 12 hr tablet   Commonly known as:  MUCINEX   Dose:  600 mg        Take 600 mg by mouth 2 times daily   Refills:  0        norethindrone-ethinyl estradiol 1-35 MG-MCG per tablet   Commonly known as:  ALAYCEN 1/35   Dose:  1 tablet   Quantity:  30 tablet        Take 1 tablet by mouth daily (Needs follow-up appointment for this medication)   Refills:  0        SUDAFED PO   Dose:  30 mg        Take 30 mg by mouth every 6 hours as needed for congestion   Refills:  0                Procedures and tests performed during your visit     HCG qualitative  urine (UPT)    UA reflex to Microscopic    US Pelvic Complete with Transvaginal      Orders Needing Specimen Collection     None      Pending Results     No orders found from 2/9/2018 to 2/12/2018.            Pending Culture Results     No orders found from 2/9/2018 to 2/12/2018.            Pending Results Instructions     If you had any lab results that were not finalized at the time of your Discharge, you can call the ED Lab Result RN at [phone]. You will be contacted by this team for any positive Lab results or changes in treatment. The nurses are available 7 days a week from 10A to 6:30P.  You can leave a message 24 hours per day and they will return your call.        Test Results From Your Hospital Stay        2/11/2018  3:56 PM      Component Results     Component Value Ref Range & Units Status    Color Urine Yellow  Final    Appearance Urine Cloudy  Final    Glucose Urine Negative NEG^Negative mg/dL Final    Bilirubin Urine Negative NEG^Negative Final    Ketones Urine Negative NEG^Negative mg/dL Final    Specific Gravity Urine 1.019 1.003 - 1.035 Final    Blood Urine Negative NEG^Negative Final    pH Urine 7.0 5.0 - 7.0 pH Final    Protein Albumin Urine Negative NEG^Negative mg/dL Final    Urobilinogen mg/dL 0.0 0.0 - 2.0 mg/dL Final    Nitrite Urine Negative NEG^Negative Final    Leukocyte Esterase Urine Negative NEG^Negative Final    Source Midstream Urine  Final    RBC Urine 1 0 - 2 /HPF Final    WBC Urine 3 (H) 0 - 2 /HPF Final    Squamous Epithelial /HPF Urine <1 0 - 1 /HPF Final    Mucous Urine Present (A) NEG^Negative /LPF Final    Amorphous Crystals Few (A) NEG^Negative /HPF Final         2/11/2018  4:08 PM      Component Results     Component Value Ref Range & Units Status    HCG Qual Urine Negative NEG^Negative Final    This test is for screening purposes.  Results should be interpreted along with   the clinical picture.  Confirmation testing is available if warranted by   ordering UUR130, HCG  "Quantitative Pregnancy.                 2018  4:37 PM      Narrative     PELVIC ULTRASOUND WITH ENDOVAGINAL TRANSDUCER    2018 4:20 PM     HISTORY: Right pelvic pain. Lump in right lower quadrant of abdomen.    TECHNIQUE:  Endovaginal sonography was added to the transabdominal  exam to better evaluate the uterus and ovaries because of inadequate  bladder fullness.    COMPARISON: None.    FINDINGS:  The uterus is normal in size.  No focal lesions are seen in  the myometrium.  Endometrium is 2 mm thick and appears normal.      The ovaries are normal in size with no focal lesions.  There are no  adnexal masses.  There is a small amount of clear free fluid in the  cul-de-sac.      No abnormalities seen over the area of the palpable lump and abdominal  pain in the right lower quadrant of the abdomen.        Impression     IMPRESSION: Normal pelvic ultrasound.    BALA GREENE MD                Thank you for choosing [facility]       Thank you for choosing [facility] for your care. Our goal is always to provide you with excellent care. Hearing back from our patients is one way we can continue to improve our services. Please take a few minutes to complete the written survey that you may receive in the mail after you visit with us. Thank you!        [employer] Information     [employer] lets you send messages to your doctor, view your test results, renew your prescriptions, schedule appointments and more. To sign up, go to [email or web address] . Click on \"Log in\" on the left side of the screen, which will take you to the Welcome page. Then click on \"Sign up Now\" on the right side of the page.     You will be asked to enter the access code listed below, as well as some personal information. Please follow the directions to create your username and password.     Your access code is: [email or web address]  Expires: 3/14/2018  1:12 PM     Your access code will  in 90 days. If you need help or a new code, please call your " [facility] or [phone].        Care EveryWhere ID     This is your Care EveryWhere ID. This could be used by other organizations to access your [facility] medical records  [id number]        Equal Access to Services     VESNA RUSSELL : Jess Marshall, wajarrodda luqadaha, qaybta kaalmada ulices, ev gill. So [facility] [phone].    ATENCIÓN: Si habla español, tiene a ashby disposición servicios gratuitos de asistencia lingüística. Llame al [phone].    We comply with applicable federal civil rights laws and [place] laws. We do not discriminate on the basis of race, color, national origin, age, disability, sex, sexual orientation, or gender identity.            After Visit Summary       This is your record. Keep this with you and show to your community pharmacist(s) and doctor(s) at your next visit.

## 2018-02-11 NOTE — ED NOTES
Pt here with abdominal discomfort that comes and goes throughout the day, first noticed about 1 week ago, pain is worse with coughing. She states she can feel a bulge in her abdomen and that she does a lot of heavy lifting at work. Pt has also felt nauseated, no vomiting. She states she has had stomach issues in the past and has felt constipated recently, lots of people at work with stomach bugs.

## 2018-02-11 NOTE — ED PROVIDER NOTES
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 She describes her pain or discomfort as a dull ache, in moderate severity, more localized to the right lower quadrant. Her discomfort is worsened after eating and while coughing. She also describes a small palpable mass in her RLQ that may be contributing to her pain, first noticed 1 week ago. She reports intermittent nausea without vomiting and chills. She has longstanding variation in bowel habits, with intermittent constipation and diarrhea. Last bowel movement was normal yesterday. No recent fevers, upper respiratory symptoms, or muscle aches.    She is currently taking an oral contraceptive. LMP was greater than 2 months ago. She reports no vaginal bleeding, spotting, or discharge. She is sexually active with her fiance. No past abdominal surgeries.     Problem List:    Patient Active Problem List    Diagnosis Date Noted     Chronic tension-type headache, not intractable 12/09/2016     Priority: Medium     Generalized anxiety disorder 07/18/2011     Priority: Medium     Diagnosis updated by automated process. Provider to review and confirm.       Moderate major depression (H) 07/18/2011     Priority: Medium        Past Medical History:    No past medical history on file.    Past Surgical History:    No past surgical history on file.    Family History:    Family History   Problem Relation Age of Onset     Psychotic Disorder Father        Social History:  Marital Status:  Single [1]  Social History   Substance Use Topics     Smoking status: Never Smoker     Smokeless tobacco: Never Used      Comment: EXPOSED AT HOME     Alcohol use No        Medications:      norethindrone-ethinyl estradiol (ALAYCEN 1/35) 1-35 MG-MCG per tablet   FLUoxetine  (PROZAC) 20 MG capsule   guaiFENesin (MUCINEX) 600 MG 12 hr tablet   Pseudoephedrine HCl (SUDAFED PO)       Review of Systems   Constitutional: No fever, malaise, weakness  ENT: No sore throat, drainage  Respiratory: No shortness of breath, wheezing  Cardiovascular: No chest pain, palpitations  Gastrointestinal: Nausea, abdominal pain (see above). No vomiting, heartburn, diarrhea, constipation, melena  Genitourinary: No urinary urgency or change in frequency, dysuria, hematuria, vaginal discharge  Skin: No rashes, sores, pruritis  Musculoskeletal: No arthralgias, myalgias  Allergic: No known allergies  Neurologic: No headache, dizziness      Physical Exam   BP: 130/76  Pulse: 77  Temp: 98.1  F (36.7  C)  Resp: 14  Weight: 54.4 kg (120 lb)  SpO2: 100 %    Physical Exam   Constitutional: well appearing, in no acute distress  Eyes: PERRL bilaterally, anicteric sclera, no conjunctival injection  ENT: moist mucous membranes, non-erythematous oropharynx, no lymphadenopathy  Respiratory: no respiratory distress, lung sounds clear to auscultation with equal air flow bilaterally, no wheezes, crackles, or rhonchi  Cardiovascular: regular rate and rhythm, normal S1 and S2, no rubs, murmurs or gallops, no JVD, peripheral pulses strong and equal bilaterally, no peripheral edema, extremities well-perfused  GI: soft, non-distended, non-rigid abdomen, moderately tender to palpation at RUQ and RLQ, increased RUQ pain with inspiration, no masses or herniation appreciated, normoactive bowel sounds  Skin: warm and dry, no ecchymosis or abrasions  Musculoskeletal: No gross deformities appreciated  Neuro: alert and oriented to person, time, and place  Psych: appropriate mood and affect, cooperative with exam      ED Course     ED Course     Procedures            Critical Care time:  none            Results for orders placed or performed during the hospital encounter of 02/11/18 (from the past 24 hour(s))   UA reflex to Microscopic    Result Value Ref Range    Color Urine Yellow     Appearance Urine Cloudy     Glucose Urine Negative NEG^Negative mg/dL    Bilirubin Urine Negative NEG^Negative    Ketones Urine Negative NEG^Negative mg/dL    Specific Gravity Urine 1.019 1.003 - 1.035    Blood Urine Negative NEG^Negative    pH Urine 7.0 5.0 - 7.0 pH    Protein Albumin Urine Negative NEG^Negative mg/dL    Urobilinogen mg/dL 0.0 0.0 - 2.0 mg/dL    Nitrite Urine Negative NEG^Negative    Leukocyte Esterase Urine Negative NEG^Negative    Source Midstream Urine     RBC Urine 1 0 - 2 /HPF    WBC Urine 3 (H) 0 - 2 /HPF    Squamous Epithelial /HPF Urine <1 0 - 1 /HPF    Mucous Urine Present (A) NEG^Negative /LPF    Amorphous Crystals Few (A) NEG^Negative /HPF   HCG qualitative urine (UPT)   Result Value Ref Range    HCG Qual Urine Negative NEG^Negative   US Pelvic Complete with Transvaginal    Narrative    PELVIC ULTRASOUND WITH ENDOVAGINAL TRANSDUCER    2/11/2018 4:20 PM     HISTORY: Right pelvic pain. Lump in right lower quadrant of abdomen.    TECHNIQUE:  Endovaginal sonography was added to the transabdominal  exam to better evaluate the uterus and ovaries because of inadequate  bladder fullness.    COMPARISON: None.    FINDINGS:  The uterus is normal in size.  No focal lesions are seen in  the myometrium.  Endometrium is 2 mm thick and appears normal.      The ovaries are normal in size with no focal lesions.  There are no  adnexal masses.  There is a small amount of clear free fluid in the  cul-de-sac.      No abnormalities seen over the area of the palpable lump and abdominal  pain in the right lower quadrant of the abdomen.      Impression    IMPRESSION: Normal pelvic ultrasound.    BALA GREENE MD         Assessments & Plan (with Medical Decision Making)     Patient is a 22-year-old female with 1 week history of RLQ abdominal discomfort with bloating and subjective sensation RLQ mass.  No masses present on physical examination.  No recent fevers,  vomiting, change in bowel habits to suggest infectious etiology. Differential includes ovarian cyst, and irritable bowel , constipation.  Physical examination is benign and reassuring and I have low suspicion for appendicitis, ovarian torsion, ectopic pregnancy. Urine showed no signs of infection, hCG negative. Pelvic US showed no cysts, lesions, or fluid.  Because of the symptoms is uncertain but I recommended a trial of bowel cleansing and observation of the symptoms.  Take milk of magnesia 30 mL and repeat in 4 hours if no results. Return with symptoms of fever > 100.4, persistent or worsening pain, vomiting, or other concerning symptoms.  Patient is comfortable with this plan and her questions were all answered.    I have reviewed the nursing notes.    I have reviewed the findings, diagnosis, plan and need for follow up with the patient.       Discharge Medication List as of 2/11/2018  4:53 PM          Final diagnoses:   RLQ abdominal pain     2/11/2018   [facility] EMERGENCY DEPARTMENT     Tank Ochoa MD  02/11/18 [id number]

## 2018-02-11 NOTE — DISCHARGE INSTRUCTIONS
Return to be seen if symptoms do not resolve, worsen, fevers develop, vomiting, or other new concerning symptoms.  Try bowel cleansing with 2 tablespoons of milk of magnesia.

## 2018-02-11 NOTE — ED AVS SNAPSHOT
[facility] Emergency Department    [address] [address]    Phone:  [phone]    Fax:  [phone]                                       Aidee Pope   MRN: [id number]    Department:  [facility] Emergency Department   Date of Visit:  2/11/2018           After Visit Summary Signature Page     I have received my discharge instructions, and my questions have been answered. I have discussed any challenges I see with this plan with the nurse or doctor.    ..........................................................................................................................................  Patient/Patient Representative Signature      ..........................................................................................................................................  Patient Representative Print Name and Relationship to Patient    ..................................................               ................................................  Date                                            Time    ..........................................................................................................................................  Reviewed by Signature/Title    ...................................................              ..............................................  Date                                                            Time

## 2018-02-11 NOTE — ED NOTES
DC instructions reviewed with pt and sig other states understanding. Pt ambulatory out of ED no change in s/s.

## 2018-02-11 NOTE — LETTER
February 11, 2018      To Whom It May Concern:      Aidee Pope was seen in our Emergency Department today, 02/11/18.  Please excuse her from work today due to an acute medical problem.    Sincerely,        Tank Ochoa MD

## 2018-03-19 DIAGNOSIS — F33.1 MAJOR DEPRESSIVE DISORDER, RECURRENT EPISODE, MODERATE (H): ICD-10-CM

## 2018-03-19 DIAGNOSIS — F41.1 GENERALIZED ANXIETY DISORDER: ICD-10-CM

## 2018-03-19 NOTE — TELEPHONE ENCOUNTER
"Requested Prescriptions   Pending Prescriptions Disp Refills     FLUoxetine (PROZAC) 20 MG capsule [Pharmacy Med Name: FLUOXETINE HCL 20 MG CAPSULE]  Last Written Prescription Date:  01/08/18  Last Fill Quantity: 30,  # refills: 0   Last office visit: 2/14/2017 with prescribing provider:  02/14/17   Future Office Visit:     30 capsule 0     Sig: TAKE 1 CAPSULE (20 MG) BY MOUTH DAILY (NEEDS FOLLOW-UP APPOINTMENT FOR THIS MEDICATION)    SSRIs Protocol Failed    3/19/2018  4:16 PM       Failed - PHQ-9 score less than 5 in past 6 months    Please review last PHQ-9 score.   PHQ-9 SCORE 4/6/2016 8/22/2016 12/9/2016   Total Score - - -   Total Score 2 8 5          Failed - Recent (6 mo) or future (30 days) visit within the authorizing provider's specialty    Patient had office visit in the last 6 months or has a visit in the next 30 days with authorizing provider or within the authorizing provider's specialty.  See \"Patient Info\" tab in inbasket, or \"Choose Columns\" in Meds & Orders section of the refill encounter.           Passed - Patient is age 18 or older       Passed - No active pregnancy on record       Passed - No positive pregnancy test in last 12 months          "

## 2018-03-22 NOTE — TELEPHONE ENCOUNTER
I have attempted to contact this patient by phone with the following results: left message to return my call on answering machine.  Needs a follow-up appointment for this request.    Mariely Porras RN

## 2018-03-23 NOTE — TELEPHONE ENCOUNTER
Spoke with pt. Appt made with PCP for Tuesday 3/27/18.  States she has enough fluoxetine to last until then so refill not placed.    Lucia NOLASCO RN

## 2018-03-25 DIAGNOSIS — Z30.41 ENCOUNTER FOR SURVEILLANCE OF CONTRACEPTIVE PILLS: ICD-10-CM

## 2018-03-26 NOTE — TELEPHONE ENCOUNTER
"Requested Prescriptions   Pending Prescriptions Disp Refills     ALAYCEN 1/35 1-35 MG-MCG per tablet [Pharmacy Med Name: ALYACEN 1-35-28 TABLET]  Last Written Prescription Date:  01/31/2018  Last Fill Quantity: 30,  # refills: 0   Last office visit: 2/14/2017 with prescribing provider:  Esthela   Future Office Visit:   Next 5 appointments (look out 90 days)     Mar 27, 2018 10:00 AM CDT   [facility] with FARA Louie CNP   [facility]    [address] [address]   [phone]                  28 tablet 0     Sig: TAKE 1 TABLET BY MOUTH DAILY (NEEDS FOLLOW-UP APPOINTMENT FOR THIS MEDICATION)    Contraceptives Protocol Passed    3/25/2018  1:29 AM       Passed - Patient is not a current smoker if age is 35 or older       Passed - Recent (12 mo) or future (30 days) visit within the authorizing provider's specialty    Patient had office visit in the last 12 months or has a visit in the next 30 days with authorizing provider or within the authorizing provider's specialty.  See \"Patient Info\" tab in inbasket, or \"Choose Columns\" in Meds & Orders section of the refill encounter.           Passed - No active pregnancy on record       Passed - No positive pregnancy test in past 12 months        Shawn España RT (R)    "

## 2018-03-29 NOTE — TELEPHONE ENCOUNTER
Medication is being filled for 1 time refill only due to clover given x1 however  pt has appt scheduled with PCP 4/3/18.      Lucia NOLASCO RN

## 2018-04-08 ENCOUNTER — HOSPITAL ENCOUNTER (EMERGENCY)
Facility: CLINIC | Age: 23
Discharge: HOME | End: 2018-04-08
Attending: EMERGENCY MEDICINE | Admitting: EMERGENCY MEDICINE
Payer: COMMERCIAL

## 2018-04-08 VITALS
HEIGHT: 62 IN | BODY MASS INDEX: 23 KG/M2 | DIASTOLIC BLOOD PRESSURE: 77 MMHG | SYSTOLIC BLOOD PRESSURE: 133 MMHG | OXYGEN SATURATION: 99 % | WEIGHT: 125 LBS | HEART RATE: 79 BPM | TEMPERATURE: 97.6 F | RESPIRATION RATE: 18 BRPM

## 2018-04-08 DIAGNOSIS — R19.7 NAUSEA, VOMITING AND DIARRHEA: ICD-10-CM

## 2018-04-08 DIAGNOSIS — R11.2 NAUSEA, VOMITING AND DIARRHEA: ICD-10-CM

## 2018-04-08 LAB — HCG UR QL: NEGATIVE

## 2018-04-08 PROCEDURE — 25000125 ZZHC RX 250: Performed by: EMERGENCY MEDICINE

## 2018-04-08 PROCEDURE — 99284 EMERGENCY DEPT VISIT MOD MDM: CPT | Mod: Z6 | Performed by: EMERGENCY MEDICINE

## 2018-04-08 PROCEDURE — 99283 EMERGENCY DEPT VISIT LOW MDM: CPT | Performed by: EMERGENCY MEDICINE

## 2018-04-08 PROCEDURE — 81025 URINE PREGNANCY TEST: CPT | Performed by: EMERGENCY MEDICINE

## 2018-04-08 RX ORDER — ONDANSETRON 4 MG/1
4 TABLET, ORALLY DISINTEGRATING ORAL ONCE
Status: COMPLETED | OUTPATIENT
Start: 2018-04-08 | End: 2018-04-08

## 2018-04-08 RX ORDER — ONDANSETRON 4 MG/1
4-8 TABLET, ORALLY DISINTEGRATING ORAL EVERY 8 HOURS PRN
Qty: 15 TABLET | Refills: 2 | Status: SHIPPED | OUTPATIENT
Start: 2018-04-08 | End: 2018-04-11

## 2018-04-08 RX ADMIN — ONDANSETRON 4 MG: 4 TABLET, ORALLY DISINTEGRATING ORAL at 09:15

## 2018-04-08 NOTE — ED PROVIDER NOTES
History     Chief Complaint   Patient presents with     Nausea & Vomiting     onset this a.m., sent from work needs note     HPI  Aidee Pope is a 22 year old female who presents to the ED with nausea and vomiting. The patient reports this morning she woke up feeling fine and after going to work started vomiting. She reports having some diarrhea this morning as well. The patient reports a co-worker left worker yesterday vomiting.  No signs or symptoms of GI bleeding.  No abdominal pain.  No fever.  Suspicious bad food intake: She ate a chicken [employer] restaurant last night. She reports her finance ate with her, but she has not been able to get a hold of him this morning. She denies recent travel and antibiotic usage. The patient reports that she does not think she would be pregnant.  No other pertinent history or acute complaints or concerns.    No past medical history on file.    No past surgical history on file.    Current Outpatient Prescriptions   Medication Sig Dispense Refill     ondansetron (ZOFRAN ODT) 4 MG ODT tab Take 1-2 tablets (4-8 mg) by mouth every 8 hours as needed for nausea 15 tablet 2     norethindrone-ethinyl estradiol (ALAYCEN 1/35) 1-35 MG-MCG per tablet Take 1 tablet by mouth daily Keep 4/3/18 appt for refills 28 tablet 0     FLUoxetine (PROZAC) 20 MG capsule Take 1 capsule (20 mg) by mouth daily (Needs follow-up appointment for this medication) 30 capsule 0     guaiFENesin (MUCINEX) 600 MG 12 hr tablet Take 600 mg by mouth 2 times daily       Pseudoephedrine HCl (SUDAFED PO) Take 30 mg by mouth every 6 hours as needed for congestion       Allergies   Allergen Reactions     Amoxicillin Rash     Bee Venom        Social History   Substance Use Topics     Smoking status: Never Smoker     Smokeless tobacco: Never Used      Comment: EXPOSED AT HOME     Alcohol use No     Problem List:    Patient Active Problem List    Diagnosis Date Noted     Chronic tension-type headache, not intractable  "12/09/2016     Priority: Medium     Generalized anxiety disorder 07/18/2011     Priority: Medium     Diagnosis updated by automated process. Provider to review and confirm.       Moderate major depression (H) 07/18/2011     Priority: Medium        Past Medical History:    No past medical history on file.    Past Surgical History:    No past surgical history on file.    Family History:    Family History   Problem Relation Age of Onset     Psychotic Disorder Father        Social History:  Marital Status:  Single [1]  Social History   Substance Use Topics     Smoking status: Never Smoker     Smokeless tobacco: Never Used      Comment: EXPOSED AT HOME     Alcohol use No        Medications:      ondansetron (ZOFRAN ODT) 4 MG ODT tab   norethindrone-ethinyl estradiol (ALAYCEN 1/35) 1-35 MG-MCG per tablet   FLUoxetine (PROZAC) 20 MG capsule   guaiFENesin (MUCINEX) 600 MG 12 hr tablet   Pseudoephedrine HCl (SUDAFED PO)       Review of Systems   As mentioned above in the history present illness. All other systems were reviewed and are negative.    Physical Exam   BP: 133/77  Pulse: 79  Temp: 97.6  F (36.4  C)  Resp: 18  Height: 157.5 cm (5' 2\")  Weight: 56.7 kg (125 lb)  SpO2: 100 %      Physical Exam   Constitutional: She is oriented to person, place, and time. She appears well-developed and well-nourished. No distress.   HENT:   Head: Normocephalic and atraumatic.   Mouth/Throat: Oropharynx is clear and moist.   Eyes: Conjunctivae and EOM are normal. No scleral icterus.   Neck: Normal range of motion. Neck supple. No tracheal deviation present. No thyromegaly present.   Cardiovascular: Normal rate, regular rhythm and normal heart sounds.  Exam reveals no gallop and no friction rub.    No murmur heard.  Pulmonary/Chest: Effort normal and breath sounds normal. No respiratory distress. She has no wheezes. She has no rales.   Abdominal: Soft. Bowel sounds are normal. She exhibits no distension. There is no tenderness. "   Musculoskeletal: Normal range of motion. She exhibits no edema.   Neurological: She is alert and oriented to person, place, and time.   Skin: Skin is warm and dry. No rash noted. She is not diaphoretic. No erythema. No pallor.   Psychiatric: She has a normal mood and affect. Her behavior is normal.   Nursing note and vitals reviewed.      ED Course     ED Course     Procedures                 Results for orders placed or performed during the hospital encounter of 04/08/18 (from the past 24 hour(s))   HCG qualitative urine   Result Value Ref Range    HCG Qual Urine Negative NEG^Negative       Medications   ondansetron (ZOFRAN-ODT) ODT tab 4 mg (4 mg Oral Given 4/8/18 0915)     10:11 AM Patient Assessed. Declined an IV and lab workup.     Assessments & Plan (with Medical Decision Making)   Patient with coworkers with similar symptoms gastroenteritis who developed nausea, vomiting and diarrhea this morning while at work.  Abdomen is benign, she was not pregnant.  She felt improved after Zofran ODT and declined labs or IV.  She was discharged with a prescription for Zofran to use as needed and instructed in supportive care.  She will return if worsening her follow-up in clinic if symptoms not resolving over the next 48 hours.    I have reviewed the nursing notes.    I have reviewed the findings, diagnosis, plan and need for follow up with the patient.    Discharge Medication List as of 4/8/2018 10:22 AM      START taking these medications    Details   ondansetron (ZOFRAN ODT) 4 MG ODT tab Take 1-2 tablets (4-8 mg) by mouth every 8 hours as needed for nausea, Disp-15 tablet, R-2, E-Prescribe             Final diagnoses:   Nausea, vomiting and diarrhea     This document serves as a record of the services and decisions personally performed and made by Aureliano Richard MD. It was created on HIS/HER behalf by   Juli Stone, a trained medical scribe. The creation of this document is based the provider's statements to  the medical scribe.  Juli Stone 9:13 AM 4/8/2018    Provider:   The information in this document, created by the medical scribe for me, accurately reflects the services I personally performed and the decisions made by me. I have reviewed and approved this document for accuracy prior to leaving the patient care area.  Aureliano Richard MD 9:13 AM 4/8/2018 4/8/2018   [facility] EMERGENCY DEPARTMENT     Aureliano Richard MD  04/08/18 8922

## 2018-04-08 NOTE — ED AVS SNAPSHOT
[facility] Emergency Department    [address] [address]    Phone:  [phone]    Fax:  [phone]                                       Aidee Pope   MRN: [id number]    Department:  [facility] Emergency Department   Date of Visit:  4/8/2018           Patient Information     Date Of Birth          1995        Your diagnoses for this visit were:     Nausea, vomiting and diarrhea        You were seen by Aureliano Richard MD.      Follow-up Information     Follow up with Belkis Proctor APRN CNP In 2 days.    Specialty:  Nurse Practitioner    Why:  For re-evaluation if symptoms not resolving    Contact information:    [address] [place] [address]  [phone]          Follow up with [facility] Emergency [facility].    Specialty:  EMERGENCY MEDICINE    Why:  If symptoms worsen    Contact information:    [address] [place] [address] [phone]    Additional information:    The medical center is located at   [address] [place] [address] (between [address]   [facility] [address] in [place], four miles north   of [place]).      Discharge References/Attachments     VOMITING AND DIARRHEA, NONSPECIFIC (ADULT) (ENGLISH)    VOMITING AND DIARRHEA, SELF-CARE FOR (ENGLISH)    VOMITING OR DIARRHEA (ADULT), DIET FOR (ENGLISH)    FOOD POISONING OR GASTROENTERITIS (ADULT) (ENGLISH)      24 Hour Appointment Hotline       To make an appointment at any [facility] clinic, call [phone] ([phone]). If you don't have a family doctor or clinic, we will help you find one. [facility] clinics are conveniently located to serve the needs of you and your family.             Review of your medicines      START taking        Dose / Directions Last dose taken    ondansetron 4 MG ODT tab   Commonly known as:  ZOFRAN ODT   Dose:  4-8 mg   Quantity:  15 tablet        Take 1-2 tablets (4-8 mg) by mouth every 8 hours as needed for nausea   Refills:  2          Our  records show that you are taking the medicines listed below. If these are incorrect, please call your family doctor or clinic.        Dose / Directions Last dose taken    FLUoxetine 20 MG capsule   Commonly known as:  PROzac   Dose:  20 mg   Quantity:  30 capsule        Take 1 capsule (20 mg) by mouth daily (Needs follow-up appointment for this medication)   Refills:  0        guaiFENesin 600 MG 12 hr tablet   Commonly known as:  MUCINEX   Dose:  600 mg        Take 600 mg by mouth 2 times daily   Refills:  0        norethindrone-ethinyl estradiol 1-35 MG-MCG per tablet   Commonly known as:  ALAYCEN 1/35   Dose:  1 tablet   Quantity:  28 tablet        Take 1 tablet by mouth daily Keep 4/3/18 appt for refills   Refills:  0        SUDAFED PO   Dose:  30 mg        Take 30 mg by mouth every 6 hours as needed for congestion   Refills:  0                Prescriptions were sent or printed at these locations (1 Prescription)                   [facility] [address]   [address] [address]    Telephone:  [phone]   Fax:  [phone]   Hours:                  E-Prescribed (1 of 1)         ondansetron (ZOFRAN ODT) 4 MG ODT tab                Procedures and tests performed during your visit     HCG qualitative urine      Orders Needing Specimen Collection     None      Pending Results     No orders found from 4/6/2018 to 4/9/2018.            Pending Culture Results     No orders found from 4/6/2018 to 4/9/2018.            Pending Results Instructions     If you had any lab results that were not finalized at the time of your Discharge, you can call the ED Lab Result RN at [phone]. You will be contacted by this team for any positive Lab results or changes in treatment. The nurses are available 7 days a week from 10A to 6:30P.  You can leave a message 24 hours per day and they will return your call.        Test Results From Your Hospital Stay        4/8/2018 10:09 AM     "  Component Results     Component Value Ref Range & Units Status    HCG Qual Urine Negative NEG^Negative Final    This test is for screening purposes.  Results should be interpreted along with   the clinical picture.  Confirmation testing is available if warranted by   ordering IMJ855, HCG Quantitative Pregnancy.                  Thank you for choosing [facility]       Thank you for choosing [facility] for your care. Our goal is always to provide you with excellent care. Hearing back from our patients is one way we can continue to improve our services. Please take a few minutes to complete the written survey that you may receive in the mail after you visit with us. Thank you!        [employer] Information     [employer] lets you send messages to your doctor, view your test results, renew your prescriptions, schedule appointments and more. To sign up, go to [email or web address] . Click on \"Log in\" on the left side of the screen, which will take you to the Welcome page. Then click on \"Sign up Now\" on the right side of the page.     You will be asked to enter the access code listed below, as well as some personal information. Please follow the directions to create your username and password.     Your access code is: [email or web address]  Expires: 2018 10:22 AM     Your access code will  in 90 days. If you need help or a new code, please call your [facility] clinic or [phone].        Care EveryWhere ID     This is your Care EveryWhere ID. This could be used by other organizations to access your [facility] medical records  [id number]        Equal Access to Services     VESNA RUSSELL : Hadii jose Marshall, wajared maguire, qaybmary ellen kaalmaandrea elena, ev gill. So [facility] [phone].    ATENCIÓN: Si habla español, tiene a ashby disposición servicios gratuitos de asistencia lingüística. Llame al [phone].    We comply with applicable federal civil rights laws and [place] laws. We do " not discriminate on the basis of race, color, national origin, age, disability, sex, sexual orientation, or gender identity.            After Visit Summary       This is your record. Keep this with you and show to your community pharmacist(s) and doctor(s) at your next visit.

## 2018-04-08 NOTE — ED AVS SNAPSHOT
[facility]    [address] [address]    Phone:  [phone]    Fax:  [phone]                                       Aidee Pope   MRN: [id number]    Department:  [facility]   Date of Visit:  4/8/2018           After Visit Summary Signature Page     I have received my discharge instructions, and my questions have been answered. I have discussed any challenges I see with this plan with the nurse or doctor.    ..........................................................................................................................................  Patient/Patient Representative Signature      ..........................................................................................................................................  Patient Representative Print Name and Relationship to Patient    ..................................................               ................................................  Date                                            Time    ..........................................................................................................................................  Reviewed by Signature/Title    ...................................................              ..............................................  Date                                                            Time

## 2018-04-08 NOTE — ED NOTES
"Pt began vomiting at work this a.m.  Pt states \"they made me leave and come here for a work note\".  She denies diarrhea, constipation and dysuria and reports feeling well until this a.m.  She is in no acute distress.   "

## 2018-04-08 NOTE — LETTER
April 8, 2018      To Whom It May Concern:      Aidee NIRU Pope was seen in our Emergency Department today, Sunday 04/08/18.  Please excuse her from work today.    Sincerely,        ANGELIC Richard MD

## 2018-04-14 ENCOUNTER — TELEPHONE (OUTPATIENT)
Dept: FAMILY MEDICINE | Facility: CLINIC | Age: 23
End: 2018-04-14

## 2018-04-14 DIAGNOSIS — F33.1 MAJOR DEPRESSIVE DISORDER, RECURRENT EPISODE, MODERATE (H): ICD-10-CM

## 2018-04-14 DIAGNOSIS — F41.1 GENERALIZED ANXIETY DISORDER: ICD-10-CM

## 2018-04-14 NOTE — LETTER
Aidee Pope  [address] [address]        April 25, 2018          Dear ,    We recently refilled one of your medications, FLUoxetine (PROZAC) 20 MG capsule; TAKE 1 CAPSULE (20 MG) BY MOUTH DAILY (NEEDS FOLLOW-UP APPOINTMENT FOR THIS MEDICATION).     We refilled this for 30 days. Prior to further refills, you will need to see your provider in clinic. To schedule this visit or with any questions, please call the clinic at [phone].        Belkis Proctor's care team/jordan

## 2018-04-18 NOTE — TELEPHONE ENCOUNTER
Patient notified she is due for OV for refill on Prozac, LOV >1 year.  Patient verbalized understanding and agreed with this plan  She reports she is at work right now and will call back after work to schedule an appointment  Waiting return phone call    Jaz BRUSH Rn

## 2018-04-19 NOTE — TELEPHONE ENCOUNTER
Left non-detailed message for patient to return a call to the clinic RN.   No appt scheduled yet.  PT needs to be seen.  LY Gillette RN

## 2018-04-20 NOTE — TELEPHONE ENCOUNTER
Yes, sent #30       Major depressive disorder, recurrent episode, moderate (H)  -     FLUoxetine (PROZAC) 20 MG capsule; TAKE 1 CAPSULE (20 MG) BY MOUTH DAILY (NEEDS FOLLOW-UP APPOINTMENT FOR THIS MEDICATION)    Generalized anxiety disorder  -     FLUoxetine (PROZAC) 20 MG capsule; TAKE 1 CAPSULE (20 MG) BY MOUTH DAILY (NEEDS FOLLOW-UP APPOINTMENT FOR THIS MEDICATION)      Covering for provider.  Tima Santoyo MD  [facility]

## 2018-04-20 NOTE — TELEPHONE ENCOUNTER
Patient has not scheduled appt yet.  Did you want to give #30?  Not sure if patient is out or will run out?  LM for her to return call to clinic.    Routing to provider.  Arleen BRUSH RN

## 2018-04-25 NOTE — TELEPHONE ENCOUNTER
Attempted to reach patient. She didn't answer. We have tried to reach her several times. I sent letter to home address for her to call the clinic for a visit prior to further refills      Monik Dhillon RN

## 2018-04-30 ENCOUNTER — TELEPHONE (OUTPATIENT)
Dept: FAMILY MEDICINE | Facility: CLINIC | Age: 23
End: 2018-04-30

## 2018-04-30 DIAGNOSIS — Z30.41 ENCOUNTER FOR SURVEILLANCE OF CONTRACEPTIVE PILLS: ICD-10-CM

## 2018-04-30 RX ORDER — NORETHINDRONE AND ETHINYL ESTRADIOL 1 MG-35MCG
KIT ORAL
Qty: 28 TABLET | Refills: 0 | Status: SHIPPED | OUTPATIENT
Start: 2018-04-30 | End: 2018-06-06

## 2018-04-30 NOTE — TELEPHONE ENCOUNTER
Routing refill request to provider for review/approval because:  Shannon given x1 and patient did not follow up, please advise  No showed her 4/3/18 appt.   Angie Bernard RNC

## 2018-04-30 NOTE — TELEPHONE ENCOUNTER
Refilled for one month.  Call and advise patient that she needs a clinic appointment for more refills.  Belkis Proctor, CNP

## 2018-04-30 NOTE — TELEPHONE ENCOUNTER
"Requested Prescriptions   Pending Prescriptions Disp Refills     ALAYCEN 1/35 1-35 MG-MCG per tablet [Pharmacy Med Name: ALYACEN 1-35-28 TABLET]  Last Written Prescription Date:  03/29/18  Last Fill Quantity: 28,  # refills: 0   Last office visit: 2/14/2017 with prescribing provider:  02/14/17   Future Office Visit:   28 tablet 0     Sig: TAKE 1 TABLET BY MOUTH DAILY KEEP 4/3/18 APPT FOR REFILLS    Contraceptives Protocol Failed    4/30/2018  1:27 AM       Failed - Recent (12 mo) or future (30 days) visit within the authorizing provider's specialty    Patient had office visit in the last 12 months or has a visit in the next 30 days with authorizing provider or within the authorizing provider's specialty.  See \"Patient Info\" tab in inbasket, or \"Choose Columns\" in Meds & Orders section of the refill encounter.         Passed - Patient is not a current smoker if age is 35 or older       Passed - No active pregnancy on record       Passed - No positive pregnancy test in past 12 months          "

## 2018-05-02 NOTE — TELEPHONE ENCOUNTER
Left message on answering machine for patient to call back.  CSS, when she calls, please advise her Belkis WALDEN gave her one more month of her birth control pills and would need to see her please for additional refills. Thanks!   Angie Bernard RNC

## 2018-05-21 DIAGNOSIS — F33.1 MAJOR DEPRESSIVE DISORDER, RECURRENT EPISODE, MODERATE (H): ICD-10-CM

## 2018-05-21 DIAGNOSIS — F41.1 GENERALIZED ANXIETY DISORDER: ICD-10-CM

## 2018-05-21 NOTE — TELEPHONE ENCOUNTER
"Requested Prescriptions   Pending Prescriptions Disp Refills     FLUoxetine (PROZAC) 20 MG capsule [Pharmacy Med Name: FLUOXETINE HCL 20 MG CAPSULE]  Last Written Prescription Date:  04/20/18  Last Fill Quantity: 30,  # refills: 0   Last office visit: 2/14/2017 with prescribing provider:  02/14/17   Future Office Visit:     30 capsule 0     Sig: TAKE 1 CAPSULE (20 MG) BY MOUTH DAILY (NEEDS FOLLOW-UP APPOINTMENT FOR THIS MEDICATION)    SSRIs Protocol Failed    5/21/2018  1:07 AM       Failed - PHQ-9 score less than 5 in past 6 months    Please review last PHQ-9 score.          Failed - Recent (6 mo) or future (30 days) visit within the authorizing provider's specialty    Patient had office visit in the last 6 months or has a visit in the next 30 days with authorizing provider or within the authorizing provider's specialty.  See \"Patient Info\" tab in inbasket, or \"Choose Columns\" in Meds & Orders section of the refill encounter.           Passed - Patient is age 18 or older       Passed - No active pregnancy on record       Passed - No positive pregnancy test in last 12 months          "

## 2018-05-23 NOTE — TELEPHONE ENCOUNTER
Prescription approved per [facility] Refill Protocol.    Patient was scheduled.    Chacha GORMAN RN

## 2018-06-06 DIAGNOSIS — Z30.41 ENCOUNTER FOR SURVEILLANCE OF CONTRACEPTIVE PILLS: ICD-10-CM

## 2018-06-06 NOTE — TELEPHONE ENCOUNTER
"Requested Prescriptions   Pending Prescriptions Disp Refills     norethindrone-ethinyl estradiol (ALAYCEN 1/35) 1-35 MG-MCG per tablet  90 day supply requested  Last Written Prescription Date:  04/30/18  Last Fill Quantity: 28,  # refills: 0   Last office visit: 2/14/2017 with prescribing provider:  02/14/17   Future Office Visit:   Next 5 appointments (look out 90 days)     Jun 11, 2018  5:00 PM CDT   [facility] with FARA Louie CNP   [facility]    [address] [address]   [phone]                  28 tablet 0    Contraceptives Protocol Passed    6/6/2018  9:20 AM       Passed - Patient is not a current smoker if age is 35 or older       Passed - Recent (12 mo) or future (30 days) visit within the authorizing provider's specialty    Patient had office visit in the last 12 months or has a visit in the next 30 days with authorizing provider or within the authorizing provider's specialty.  See \"Patient Info\" tab in inbasket, or \"Choose Columns\" in Meds & Orders section of the refill encounter.           Passed - No active pregnancy on record       Passed - No positive pregnancy test in past 12 months          "

## 2018-06-09 DIAGNOSIS — Z30.41 ENCOUNTER FOR SURVEILLANCE OF CONTRACEPTIVE PILLS: ICD-10-CM

## 2018-06-12 RX ORDER — NORETHINDRONE AND ETHINYL ESTRADIOL 1 MG-35MCG
KIT ORAL
Qty: 28 TABLET | Refills: 0 | Status: SHIPPED | OUTPATIENT
Start: 2018-06-12 | End: 2018-08-07

## 2018-06-12 NOTE — TELEPHONE ENCOUNTER
Routing refill request to provider for review/approval because:  Patient needs to be seen because it has been more than 1 year since last office visit.  Pt no showed for appt scheduled for 6/11/18.    Lucia NOLASCO RN

## 2018-06-12 NOTE — TELEPHONE ENCOUNTER
"Requested Prescriptions   Pending Prescriptions Disp Refills     ALAYCEN 1/35 1-35 MG-MCG per tablet [Pharmacy Med Name: ALYACEN 1-35-28 TABLET]  Last Written Prescription Date:  06/06/2018  Last Fill Quantity: 28,  # refills: 0   Last office visit: 2/14/2017 with prescribing provider:  Esthela   Future Office Visit:     28 tablet 0     Sig: TAKE 1 TABLET BY MOUTH EVERY DAY    Contraceptives Protocol Passed    6/9/2018  3:02 PM       Passed - Patient is not a current smoker if age is 35 or older       Passed - Recent (12 mo) or future (30 days) visit within the authorizing provider's specialty    Patient had office visit in the last 12 months or has a visit in the next 30 days with authorizing provider or within the authorizing provider's specialty.  See \"Patient Info\" tab in inbasket, or \"Choose Columns\" in Meds & Orders section of the refill encounter.           Passed - No active pregnancy on record       Passed - No positive pregnancy test in past 12 months          "

## 2018-06-21 ENCOUNTER — TELEPHONE (OUTPATIENT)
Dept: FAMILY MEDICINE | Facility: CLINIC | Age: 23
End: 2018-06-21

## 2018-06-21 DIAGNOSIS — F33.1 MAJOR DEPRESSIVE DISORDER, RECURRENT EPISODE, MODERATE (H): ICD-10-CM

## 2018-06-21 DIAGNOSIS — F41.1 GENERALIZED ANXIETY DISORDER: ICD-10-CM

## 2018-06-21 NOTE — TELEPHONE ENCOUNTER
"Requested Prescriptions   Pending Prescriptions Disp Refills     FLUoxetine (PROZAC) 20 MG capsule [Pharmacy Med Name: FLUOXETINE HCL 20 MG CAPSULE]  Last Written Prescription Date:  05/23/18  Last Fill Quantity: 30,  # refills: 0   Last office visit: 2/14/2017 with prescribing provider:  02/14/17   Future Office Visit:     30 capsule 0     Sig: TAKE 1 CAPSULE (20 MG) BY MOUTH DAILY (NEEDS FOLLOW-UP APPOINTMENT FOR THIS MEDICATION)    SSRIs Protocol Failed    6/21/2018  1:41 AM       Failed - PHQ-9 score less than 5 in past 6 months    Please review last PHQ-9 score.   PHQ-9 SCORE 4/6/2016 8/22/2016 12/9/2016   Total Score - - -   Total Score 2 8 5          Failed - Recent (6 mo) or future (30 days) visit within the authorizing provider's specialty    Patient had office visit in the last 6 months or has a visit in the next 30 days with authorizing provider or within the authorizing provider's specialty.  See \"Patient Info\" tab in inbasket, or \"Choose Columns\" in Meds & Orders section of the refill encounter.           Passed - Patient is age 18 or older       Passed - No active pregnancy on record       Passed - No positive pregnancy test in last 12 months          "

## 2018-06-21 NOTE — LETTER
June 28, 2018      Aidee Pope  [address] [address] [address]        Dear Aidee,     You are due for your annual visit. Your doctor gave you a 30 day clover period on your medication. However, you will not be able to refill your prescription again without making an appointment. Please call [phone] to schedule.       Sincerely,        Tima Santoyo MD/ RAFAEL

## 2018-06-22 NOTE — TELEPHONE ENCOUNTER
Routing refill request to provider for review/approval because:  Shannon given x1 and patient did not follow up, please advise  Patient needs to be seen because it has been more than 1 year since last office visit.    Lucia NOLASCO RN

## 2018-06-25 NOTE — TELEPHONE ENCOUNTER
Message left for patient to return call to clinic.  CSS - ok to deliver message below.    Arleen BRUSH RN

## 2018-06-26 NOTE — TELEPHONE ENCOUNTER
Left message for patient to return call to clinic.    CSS ok to deliver message below.    Please give patient Belkis Esthela's recommendations on 6/25/18    Jaz BRUSH Rn

## 2018-06-27 NOTE — TELEPHONE ENCOUNTER
Left message on patient's voice mail and mother of patient's voice mail for patient to return call to clinic    Jaz BRUSH Rn

## 2018-07-16 ENCOUNTER — TELEPHONE (OUTPATIENT)
Dept: FAMILY MEDICINE | Facility: CLINIC | Age: 23
End: 2018-07-16

## 2018-08-07 DIAGNOSIS — Z30.41 ENCOUNTER FOR SURVEILLANCE OF CONTRACEPTIVE PILLS: ICD-10-CM

## 2018-08-07 RX ORDER — NORETHINDRONE AND ETHINYL ESTRADIOL 1 MG-35MCG
KIT ORAL
Qty: 28 TABLET | Refills: 0 | Status: SHIPPED | OUTPATIENT
Start: 2018-08-07 | End: 2018-09-28

## 2018-08-07 NOTE — TELEPHONE ENCOUNTER
"Requested Prescriptions   Pending Prescriptions Disp Refills     ALAYCEN 1/35 1-35 MG-MCG per tablet [Pharmacy Med Name: ALYACEN 1-35-28 TABLET] 28 tablet 0     Sig: TAKE 1 TABLET BY MOUTH EVERY DAY    Contraceptives Protocol Failed    8/7/2018  2:00 AM       Failed - Recent (12 mo) or future (30 days) visit within the authorizing provider's specialty    Patient had office visit in the last 12 months or has a visit in the next 30 days with authorizing provider or within the authorizing provider's specialty.  See \"Patient Info\" tab in inbasket, or \"Choose Columns\" in Meds & Orders section of the refill encounter.      Last Written Prescription Date:  6/12/18  Last Fill Quantity: 28,  # refills: 0   Last office visit: 2/14/2017 with prescribing provider:     Future Office Visit:             Passed - Patient is not a current smoker if age is 35 or older       Passed - No active pregnancy on record       Passed - No positive pregnancy test in past 12 months          "

## 2018-08-07 NOTE — TELEPHONE ENCOUNTER
Routing refill request to provider for review/approval because:  Shannon given x1 and patient did not follow up, please advise  Patient needs to be seen because it has been more than 1 year since last office visit.

## 2018-08-08 NOTE — TELEPHONE ENCOUNTER
I have attempted to call the patient and someone picks up the phone and then hangs up.  I have called and left a message with the need to be seen for further refills. Vanesa LOUIE RN

## 2018-09-28 ENCOUNTER — OFFICE VISIT (OUTPATIENT)
Dept: FAMILY MEDICINE | Facility: CLINIC | Age: 23
End: 2018-09-28
Payer: COMMERCIAL

## 2018-09-28 VITALS
HEIGHT: 63 IN | DIASTOLIC BLOOD PRESSURE: 68 MMHG | HEART RATE: 88 BPM | BODY MASS INDEX: 21.44 KG/M2 | SYSTOLIC BLOOD PRESSURE: 120 MMHG | WEIGHT: 121 LBS | TEMPERATURE: 98.1 F | OXYGEN SATURATION: 98 %

## 2018-09-28 DIAGNOSIS — F32.1 MODERATE MAJOR DEPRESSION (H): ICD-10-CM

## 2018-09-28 DIAGNOSIS — Z30.41 ENCOUNTER FOR SURVEILLANCE OF CONTRACEPTIVE PILLS: ICD-10-CM

## 2018-09-28 DIAGNOSIS — Z01.419 ENCOUNTER FOR GYNECOLOGICAL EXAMINATION WITHOUT ABNORMAL FINDING: Primary | ICD-10-CM

## 2018-09-28 DIAGNOSIS — F41.1 GENERALIZED ANXIETY DISORDER: ICD-10-CM

## 2018-09-28 PROCEDURE — G0145 SCR C/V CYTO,THINLAYER,RESCR: HCPCS | Performed by: NURSE PRACTITIONER

## 2018-09-28 PROCEDURE — 99395 PREV VISIT EST AGE 18-39: CPT | Performed by: NURSE PRACTITIONER

## 2018-09-28 PROCEDURE — 99214 OFFICE O/P EST MOD 30 MIN: CPT | Mod: 25 | Performed by: NURSE PRACTITIONER

## 2018-09-28 ASSESSMENT — PATIENT HEALTH QUESTIONNAIRE - PHQ9: 5. POOR APPETITE OR OVEREATING: MORE THAN HALF THE DAYS

## 2018-09-28 ASSESSMENT — ANXIETY QUESTIONNAIRES
IF YOU CHECKED OFF ANY PROBLEMS ON THIS QUESTIONNAIRE, HOW DIFFICULT HAVE THESE PROBLEMS MADE IT FOR YOU TO DO YOUR WORK, TAKE CARE OF THINGS AT HOME, OR GET ALONG WITH OTHER PEOPLE: VERY DIFFICULT
3. WORRYING TOO MUCH ABOUT DIFFERENT THINGS: NEARLY EVERY DAY
6. BECOMING EASILY ANNOYED OR IRRITABLE: SEVERAL DAYS
1. FEELING NERVOUS, ANXIOUS, OR ON EDGE: MORE THAN HALF THE DAYS
GAD7 TOTAL SCORE: 12
2. NOT BEING ABLE TO STOP OR CONTROL WORRYING: MORE THAN HALF THE DAYS
5. BEING SO RESTLESS THAT IT IS HARD TO SIT STILL: SEVERAL DAYS
7. FEELING AFRAID AS IF SOMETHING AWFUL MIGHT HAPPEN: SEVERAL DAYS

## 2018-09-28 NOTE — MR AVS SNAPSHOT
After Visit Summary   9/28/2018    Aidee Pope    MRN: [id number]           Patient Information     Date Of Birth          1995        Visit Information        Provider Department      9/28/2018 9:20 AM Belkis Proctor APRN [facility]        Today's Diagnoses     Encounter for gynecological examination without abnormal finding    -  1    Moderate major depression (H)        Generalized anxiety disorder        Encounter for surveillance of contraceptive pills          Care Instructions    Start sertraline (Zoloft): Take 1/2 tablet (25 mg) for 1 week, then increase to 1 tablet (50mg) daily    Discussed risks/bennefits and possible side effects of antidepressants, including but not limited to GI upset, disrupted sleep, loss of libido, worsening of mood or even possible risk of increased suicidal thoughts.  These medications often take 4-6 weeks to be effective.    Also discussed the importance of using them for 6-9 months before stopping, to avoid rebound symptoms.   Contact the clinic if having any adverse effects.  Do not stop the medication abruptly.    Verbal contract for saftey discussed, patient should contact the clinic or 24 hour nurse line if any thoughts of self harm.    Follow up in one month or sooner if problems.            Preventive Health Recommendations  Female Ages 21 to 25     Yearly exam:     See your health care provider every year in order to  o Review health changes.   o Discuss preventive care.    o Review your medicines if your doctor has prescribed any.      You should be tested each year for STDs (sexually transmitted diseases).       Talk to your provider about how often you should have cholesterol testing.      Get a Pap test every three years. If you have an abnormal result, your doctor may have you test more often.      If you are at risk for diabetes, you should have a diabetes test (fasting glucose).     Shots:     Get a flu shot  "each year.     Get a tetanus shot every 10 years.     Consider getting the shot (vaccine) that prevents cervical cancer (Gardasil).    Nutrition:     Eat at least 5 servings of fruits and vegetables each day.    Eat whole-grain bread, whole-wheat pasta and brown rice instead of white grains and rice.    Get adequate Calcium and Vitamin D.     Lifestyle    Exercise at least 150 minutes a week each week (30 minutes a day, 5 days a week). This will help you control your weight and prevent disease.    Limit alcohol to one drink per day.    No smoking.     Wear sunscreen to prevent skin cancer.    See your dentist every six months for an exam and cleaning.          Follow-ups after your visit        Who to contact     If you have questions or need follow up information about today's clinic visit or your schedule please contact [facility] directly at [phone].  Normal or non-critical lab and imaging results will be communicated to you by MyChart, letter or phone within 4 business days after the clinic has received the results. If you do not hear from us within 7 days, please contact the clinic through Sync[employer] Systemshart or phone. If you have a critical or abnormal lab result, we will notify you by phone as soon as possible.  Submit refill requests through Hylete or call your pharmacy and they will forward the refill request to us. Please allow 3 business days for your refill to be completed.          Additional Information About Your Visit        Hylete Information     Hylete lets you send messages to your doctor, view your test results, renew your prescriptions, schedule appointments and more. To sign up, go to [email or web address] . Click on \"Log in\" on the left side of the screen, which will take you to the Welcome page. Then click on \"Sign up Now\" on the right side of the page.     You will be asked to enter the access code listed below, as well as some personal information. Please follow the directions " "to create your username and password.     Your access code is: [email or web address]  Expires: 2018  9:13 AM     Your access code will  in 90 days. If you need help or a new code, please call your [place] clinic or [phone].        Care EveryWhere ID     This is your Care EveryWhere ID. This could be used by other organizations to access your [place] medical records  [id number]        Your Vitals Were     Pulse Temperature Height Pulse Oximetry BMI (Body Mass Index)       88 98.1  F (36.7  C) (Tympanic) 5' 2.75\" (1.594 m) 98% 21.61 kg/m2        Blood Pressure from Last 3 Encounters:   18 120/68   18 133/77   18 130/76    Weight from Last 3 Encounters:   18 121 lb (54.9 kg)   18 125 lb (56.7 kg)   18 120 lb (54.4 kg)              We Performed the Following     PAP imaged thin layer screen only - recommended age 21 - 24 years          Today's Medication Changes          These changes are accurate as of 18  9:41 AM.  If you have any questions, ask your nurse or doctor.               Start taking these medicines.        Dose/Directions    sertraline 50 MG tablet   Commonly known as:  ZOLOFT   Used for:  Moderate major depression (H), Generalized anxiety disorder   Started by:  Belkis Proctor APRN CNP        Take 1/2 tablet (25 mg) for 1 week, then increase to 1 tablet (50mg) daily   Quantity:  30 tablet   Refills:  1         These medicines have changed or have updated prescriptions.        Dose/Directions    norethindrone-ethinyl estradiol 1-35 MG-MCG per tablet   Commonly known as:  ALAYCEN 1/   This may have changed:  See the new instructions.   Used for:  Encounter for surveillance of contraceptive pills   Changed by:  Belkis Proctor APRN CNP        Dose:  1 tablet   Take 1 tablet by mouth daily   Quantity:  84 tablet   Refills:  3         Stop taking these medicines if you haven't already. Please contact your care team if you have " questions.     FLUoxetine 20 MG capsule   Commonly known as:  PROzac   Stopped by:  Belkis Proctor APRN CNP                Where to get your medicines      These medications were sent to [place] [address] [place] [facility] [place] [address] [facility] [address]     Phone:  [phone]     norethindrone-ethinyl estradiol 1-35 MG-MCG per tablet    sertraline 50 MG tablet                Primary Care Provider Office Phone # Fax #    FARA Louie [phone] [address] [facility] [address]        Equal Access to Services     VESNA RUSSELL : Hadii aad goyo hadasho Soomaali, waaxda luqadaha, qaybta kaalmada adeegyada, waxharpal gill. So [facility] [phone].    ATENCIÓN: Si habla español, tiene a ashby disposición servicios gratuitos de asistencia lingüística. [facility] [phone].    We comply with applicable federal civil rights laws and [place] laws. We do not discriminate on the basis of race, color, national origin, age, disability, sex, sexual orientation, or gender identity.            Thank you!     Thank you for choosing [facility]  for your care. Our goal is always to provide you with excellent care. Hearing back from our patients is one way we can continue to improve our services. Please take a few minutes to complete the written survey that you may receive in the mail after your visit with us. Thank you!             Your Updated Medication List - Protect others around you: Learn how to safely use, store and throw away your medicines at www.disposemymeds.org.          This list is accurate as of 9/28/18  9:41 AM.  Always use your most recent med list.                   Brand Name Dispense Instructions for use Diagnosis    norethindrone-ethinyl estradiol 1-35 MG-MCG per tablet    ALAYCEN 1/35    84 tablet    Take 1 tablet by mouth daily    Encounter for surveillance of contraceptive pills        sertraline 50 MG tablet    ZOLOFT    30 tablet    Take 1/2 tablet (25 mg) for 1 week, then increase to 1 tablet (50mg) daily    Moderate major depression (H), Generalized anxiety disorder

## 2018-09-28 NOTE — LETTER
October 6, 2018      Aideeisael Pope  [address] [address] [facility] [address]    Dear ,      I am happy to inform you that your recent cervical cancer screening test (PAP smear) was normal.      Preventative screenings such as this help to ensure your health for years to come. You should repeat a pap smear in 3 years, unless otherwise directed.      You will still need to return to the clinic every year for your annual exam and other preventive tests.     If you have additional questions regarding this result, please call our registered nurseLou at [phone].      Sincerely,      Belkis Proctor, FARA CNP/rlm

## 2018-09-28 NOTE — PROGRESS NOTES
SUBJECTIVE:   CC: Aidee Pope is an 22 year old woman who presents for preventive health visit.     Healthy Habits:    Do you get at least three servings of calcium containing foods daily (dairy, green leafy vegetables, etc.)? yes    Amount of exercise or daily activities, outside of work: 3 day(s) per week; physical job    Problems taking medications regularly not applicable    Medication side effects: No    Have you had an eye exam in the past two years? no    Do you see a dentist twice per year? no    Do you have sleep apnea, excessive snoring or daytime drowsiness?no      Refill OCP      Anxiety Follow-Up    Status since last visit: Worsened     Hasn't been on medication for about 4 months    Fluoxetine made her tired all the time    Her brother uses zoloft with good results.    Other associated symptoms: increase anxiousness    Complicating factors:   Significant life event: Yes-  Getting ; promotion at work   Current substance abuse: None  Depression symptoms: No  JERRY-7 SCORE 8/22/2016 12/9/2016 9/28/2018   Total Score - - -   Total Score 4 0 12           Today's PHQ-2 Score:   PHQ-2 ( 1999 Pf[employer]) 9/28/2018 2/14/2017   Q1: Little interest or pleasure in doing things 0 0   Q2: Feeling down, depressed or hopeless 0 0   PHQ-2 Score 0 0       Abuse: Current or Past(Physical, Sexual or Emotional)- No  Do you feel safe in your environment - Yes    Social History   Substance Use Topics     Smoking status: Never Smoker     Smokeless tobacco: Never Used      Comment: EXPOSED AT HOME     Alcohol use Yes      Comment: less than 7 drinks per week     If you drink alcohol do you typically have >3 drinks per day or >7 drinks per week? No                     Reviewed orders with patient.  Reviewed health maintenance and updated orders accordingly - Yes            History of abnormal Pap smear: NO - age 21-29 PAP every 3 years recommended  PAP / HPV 10/12/2015   PAP NIL     Reviewed and updated as needed this  "visit by clinical staff  Tobacco  Allergies  Med Hx  Surg Hx  Fam Hx  Soc Hx        Reviewed and updated as needed this visit by Provider            ROS:  CONSTITUTIONAL: NEGATIVE for fever, chills, change in weight  INTEGUMENTARU/SKIN: NEGATIVE for worrisome rashes, moles or lesions  EYES: NEGATIVE for vision changes or irritation  ENT: NEGATIVE for ear, mouth and throat problems  RESP: NEGATIVE for significant cough or SOB  BREAST: NEGATIVE for masses, tenderness or discharge  CV: NEGATIVE for chest pain, palpitations or peripheral edema  GI: NEGATIVE for nausea, abdominal pain, heartburn, or change in bowel habits  : NEGATIVE for unusual urinary or vaginal symptoms. Periods are regular.  MUSCULOSKELETAL: NEGATIVE for significant arthralgias or myalgia  NEURO: NEGATIVE for weakness, dizziness or paresthesias  PSYCHIATRIC: NEGATIVE for changes in mood or affect    OBJECTIVE:   /68 (BP Location: Right arm)  Pulse 88  Temp 98.1  F (36.7  C) (Tympanic)  Ht 5' 2.75\" (1.594 m)  Wt 121 lb (54.9 kg)  SpO2 98%  BMI 21.61 kg/m2  EXAM:  GENERAL: healthy, alert and no distress  EYES: Eyes grossly normal to inspection, PERRL and conjunctivae and sclerae normal  HENT: ear canals and TM's normal, nose and mouth without ulcers or lesions  NECK: no adenopathy, no asymmetry, masses, or scars and thyroid normal to palpation  RESP: lungs clear to auscultation - no rales, rhonchi or wheezes  BREAST: normal without masses, tenderness or nipple discharge and no palpable axillary masses or adenopathy  CV: regular rate and rhythm, normal S1 S2, no S3 or S4, no murmur, click or rub, no peripheral edema and peripheral pulses strong  ABDOMEN: soft, nontender, no hepatosplenomegaly, no masses and bowel sounds normal   (female): normal female external genitalia, normal urethral meatus, vaginal mucosa pink, moist, well rugated, and normal cervix/adnexa/uterus without masses or discharge  MS: no gross musculoskeletal defects " "noted, no edema  SKIN: no suspicious lesions or rashes  NEURO: Normal strength and tone, mentation intact and speech normal  PSYCH: mentation appears normal, affect normal/bright    PHQ-9 SCORE 8/22/2016 12/9/2016 9/28/2018   Total Score - - -   Total Score 8 5 7       JERRY-7 SCORE 8/22/2016 12/9/2016 9/28/2018   Total Score - - -   Total Score 4 0 12       ASSESSMENT/PLAN:       ICD-10-CM    1. Encounter for gynecological examination without abnormal finding Z01.419 PAP imaged thin layer screen only - recommended age 21 - 24 years     2. Moderate major depression (H) F32.1 Poorly controlled  Start sertraline (ZOLOFT) 50 MG tablet  Follow up in one month     OFFICE/OUTPT VISIT,EST,LEVL III     3. Generalized anxiety disorder F41.1 Poorly controlled  Start sertraline (ZOLOFT) 50 MG tablet  Follow up in one month  sertraline (ZOLOFT) 50 MG tablet     OFFICE/OUTPT VISIT,EST,LEVL III     4. Encounter for surveillance of contraceptive pills Z30.41 norethindrone-ethinyl estradiol (ALAYCEN 1/35) 1-35 MG-MCG per tablet       COUNSELING:   Reviewed preventive health counseling, as reflected in patient instructions    BP Readings from Last 1 Encounters:   09/28/18 120/68     Estimated body mass index is 21.61 kg/(m^2) as calculated from the following:    Height as of this encounter: 5' 2.75\" (1.594 m).    Weight as of this encounter: 121 lb (54.9 kg).           reports that she has never smoked. She has never used smokeless tobacco.        The risks, benefits and treatment options of prescribed medications or other treatments have been discussed with the patient. The patient verbalized their understanding and should call or follow up if no improvement or if they develop further problems.    FARA [facility]  "

## 2018-09-28 NOTE — PATIENT INSTRUCTIONS
Start sertraline (Zoloft): Take 1/2 tablet (25 mg) for 1 week, then increase to 1 tablet (50mg) daily    Discussed risks/bennefits and possible side effects of antidepressants, including but not limited to GI upset, disrupted sleep, loss of libido, worsening of mood or even possible risk of increased suicidal thoughts.  These medications often take 4-6 weeks to be effective.    Also discussed the importance of using them for 6-9 months before stopping, to avoid rebound symptoms.   Contact the clinic if having any adverse effects.  Do not stop the medication abruptly.    Verbal contract for saftey discussed, patient should contact the clinic or 24 hour nurse line if any thoughts of self harm.    Follow up in one month or sooner if problems.            Preventive Health Recommendations  Female Ages 21 to 25     Yearly exam:     See your health care provider every year in order to  o Review health changes.   o Discuss preventive care.    o Review your medicines if your doctor has prescribed any.      You should be tested each year for STDs (sexually transmitted diseases).       Talk to your provider about how often you should have cholesterol testing.      Get a Pap test every three years. If you have an abnormal result, your doctor may have you test more often.      If you are at risk for diabetes, you should have a diabetes test (fasting glucose).     Shots:     Get a flu shot each year.     Get a tetanus shot every 10 years.     Consider getting the shot (vaccine) that prevents cervical cancer (Gardasil).    Nutrition:     Eat at least 5 servings of fruits and vegetables each day.    Eat whole-grain bread, whole-wheat pasta and brown rice instead of white grains and rice.    Get adequate Calcium and Vitamin D.     Lifestyle    Exercise at least 150 minutes a week each week (30 minutes a day, 5 days a week). This will help you control your weight and prevent disease.    Limit alcohol to one drink per day.    No  smoking.     Wear sunscreen to prevent skin cancer.    See your dentist every six months for an exam and cleaning.

## 2018-09-29 ASSESSMENT — PATIENT HEALTH QUESTIONNAIRE - PHQ9: SUM OF ALL RESPONSES TO PHQ QUESTIONS 1-9: 7

## 2018-09-29 ASSESSMENT — ANXIETY QUESTIONNAIRES: GAD7 TOTAL SCORE: 12

## 2018-10-02 LAB
COPATH REPORT: NORMAL
PAP: NORMAL

## 2018-10-15 ENCOUNTER — OFFICE VISIT (OUTPATIENT)
Dept: FAMILY MEDICINE | Facility: CLINIC | Age: 23
End: 2018-10-15
Payer: COMMERCIAL

## 2018-10-15 VITALS
SYSTOLIC BLOOD PRESSURE: 124 MMHG | TEMPERATURE: 98.2 F | BODY MASS INDEX: 21.55 KG/M2 | HEIGHT: 63 IN | WEIGHT: 121.6 LBS | DIASTOLIC BLOOD PRESSURE: 72 MMHG | HEART RATE: 86 BPM

## 2018-10-15 DIAGNOSIS — M79.674 PAIN OF TOE OF RIGHT FOOT: Primary | ICD-10-CM

## 2018-10-15 PROCEDURE — 99213 OFFICE O/P EST LOW 20 MIN: CPT | Performed by: PHYSICIAN ASSISTANT

## 2018-10-15 NOTE — MR AVS SNAPSHOT
"              After Visit Summary   10/15/2018    Aidee Pope    MRN: [id number]           Patient Information     Date Of Birth          1995        Visit Information        Provider Department      10/15/2018 3:00 PM Sandra Villalobos PA-C [facility]        Today's Diagnoses     Pain of toe of right foot    -  1      Care Instructions    Buddy tape if comfortable  Post op shoe 2 weeks  Continue ice as needed  Follow up if not improving 2 weeks          Follow-ups after your visit        Follow-up notes from your care team     Return in about 2 weeks (around 10/29/2018), or if symptoms worsen or fail to improve.      Who to contact     Normal or non-critical lab and imaging results will be communicated to you by Plivohart, letter or phone within 4 business days after the clinic has received the results. If you do not hear from us within 7 days, please contact the clinic through MyChart or phone. If you have a critical or abnormal lab result, we will notify you by phone as soon as possible.  Submit refill requests through Boostable or call your pharmacy and they will forward the refill request to us. Please allow 3 business days for your refill to be completed.          If you need to speak with a  for additional information , please call: [phone]             Additional Information About Your Visit        Boostable Information     Boostable lets you send messages to your doctor, view your test results, renew your prescriptions, schedule appointments and more. To sign up, go to [email or web address] . Click on \"Log in\" on the left side of the screen, which will take you to the Welcome page. Then click on \"Sign up Now\" on the right side of the page.     You will be asked to enter the access code listed below, as well as some personal information. Please follow the directions to create your username and password.     Your access code is: [email or web address]  Expires: 12/27/2018  9:13 AM   " "  Your access code will  in 90 days. If you need help or a new code, please call your [place] clinic or [phone].        Care EveryWhere ID     This is your Care EveryWhere ID. This could be used by other organizations to access your [place] medical records  [id number]        Your Vitals Were     Pulse Temperature Height BMI (Body Mass Index)          86 98.2  F (36.8  C) (Tympanic) 5' 2.56\" (1.589 m) 21.85 kg/m2         Blood Pressure from Last 3 Encounters:   10/15/18 124/72   18 120/68   18 133/77    Weight from Last 3 Encounters:   10/15/18 121 lb 9.6 oz (55.2 kg)   18 121 lb (54.9 kg)   18 125 lb (56.7 kg)              Today, you had the following     No orders found for display       Primary Care Provider Office Phone # Fax #    Belkis Chacon Marci Proctor, APRN [facility] [phone] [address] [facility] [address]        Equal Access to Services     PREETI RUSSELL : Hadii aad ku hadasho Soomaali, waaxda luqadaha, qaybta kaalmada adeegyada, ev العلي . So [facility] [phone].    ATENCIÓN: Si habla español, tiene a ashby disposición servicios gratuitos de asistencia lingüística. [facility] [phone].    We comply with applicable federal civil rights laws and [place] laws. We do not discriminate on the basis of race, color, national origin, age, disability, sex, sexual orientation, or gender identity.            Thank you!     Thank you for choosing [facility]  for your care. Our goal is always to provide you with excellent care. Hearing back from our patients is one way we can continue to improve our services. Please take a few minutes to complete the written survey that you may receive in the mail after your visit with us. Thank you!             Your Updated Medication List - Protect others around you: Learn how to safely use, store and throw away your medicines at www.disposemymeds.org.          This list is " accurate as of 10/15/18  3:29 PM.  Always use your most recent med list.                   Brand Name Dispense Instructions for use Diagnosis    norethindrone-ethinyl estradiol 1-35 MG-MCG per tablet    ALAYCEN 1/35    84 tablet    Take 1 tablet by mouth daily    Encounter for surveillance of contraceptive pills       sertraline 50 MG tablet    ZOLOFT    30 tablet    Take 1/2 tablet (25 mg) for 1 week, then increase to 1 tablet (50mg) daily    Moderate major depression (H), Generalized anxiety disorder

## 2018-10-15 NOTE — PATIENT INSTRUCTIONS
Buddy tape if comfortable  Post op shoe 2 weeks  Continue ice as needed  Follow up if not improving 2 weeks

## 2018-10-15 NOTE — PROGRESS NOTES
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  Additional history: as documented      Reviewed and updated as needed this visit by clinical staff  Tobacco  Allergies  Meds  Problems  Med Hx  Surg Hx  Fam Hx  Soc Hx        Reviewed and updated as needed this visit by Provider  Allergies  Meds  Problems         ROS:  Other than noted above, general, HEENT, respiratory, cardiac, MS, and gastrointestinal systems are negative.     OBJECTIVE:     /72  Pulse 86  Temp 98.2  F (36.8  C) (Tympanic)  Ht 5' 2.56\" (1.589 m)  Wt 121 lb 9.6 oz (55.2 kg)  BMI 21.85 kg/m2  Body mass index is 21.85 kg/(m^2).  GENERAL APPEARANCE: healthy, aert and no distress  ORTHO: Foot Exam: Inspection:  5th toe distally and DIP joint tender. Mild bruising no swelling  Non-tender: no other bony tenderness  Range of Motion:flexion of toes:  full, extension of toes  full    ASSESSMENT/PLAN:     ASSESSMENT/PLAN:      ICD-10-CM    1. Pain of toe of right foot M79.674      She is planning follow up with PCP in a few weeks for zoloft. Started recently only  We discussed x-ray, deferred today as would not likely change plan given area of patient's pain  She felt improved with buddy tape and post op shoe    Patient Instructions   Buddy tape if comfortable  Post op shoe 2 weeks  Continue ice as needed  Follow up if not improving 2 weeks    Sandra Villalobos PA-C  [facility]  "

## 2018-10-15 NOTE — LETTER
[facility]  [address] [address]  Phone: [phone]    October 15, 2018        Aidee Pope  [address] [address]          To whom it may concern:    RE: Aidee Pope    Patient was seen and treated today at our clinic.  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    Please contact me for questions or concerns.      Sincerely,        Sandra Villalobos PA-C

## 2018-10-22 ENCOUNTER — TELEPHONE (OUTPATIENT)
Dept: FAMILY MEDICINE | Facility: CLINIC | Age: 23
End: 2018-10-22

## 2018-10-22 DIAGNOSIS — F41.1 GENERALIZED ANXIETY DISORDER: ICD-10-CM

## 2018-10-22 DIAGNOSIS — F32.1 MODERATE MAJOR DEPRESSION (H): ICD-10-CM

## 2018-10-22 NOTE — LETTER
[facility]  [address] [address]  Phone: [phone]       October 26, 2018         Aidee Pope  [address] [address]            Dear Aidee:    We are concerned about your health care.  We recently provided you with a medication refill.  Many medications require routine follow-up with your doctor.    Your prescription for sertraline has been refilled for one month so you may have time for the above noted follow-up. Please call to schedule soon so we can assure you have an appointment before your next refills are needed.    If you are unable to come in to the clinic, you can schedule a Telephone Visit:    You now have the option to visit with a provider over the phone rather than having to come to the clinic. Telephone visits can be done virtually anywhere you have telephone access.    There would be a charge billed to your insurance company. If your insurance does not cover it, it will be billed to you. It is charged based on time spent on the phone with you, in increments of 10 minutes of medical discussion with the provider.    The first 10 minutes of medical discussion is $30.  11-20 minutes of medical discussion is $59.  21-30 minutes of medical discussion is $85.  If you have questions regarding coverage, please contact your insurance company.    Prior to the phone visit, you will be called by either a Medical Assistant or RN to obtain a verbal consent. We will need to verify your insurance, update your medications, allergies, past medical, social and family histories. Once all relevant information is gathered and verified, you will be contacted on the day of your scheduled time for your telephone visit.    Of course if you wish to see the provider in office instead, we can help you with that as well. If you have [employer] you can also send a request for an E-Visit to the provider (which is also billed to insurance and the cost is  $35).          Thank you,      LANE Dill / Lucia NOLASCO RN

## 2018-10-22 NOTE — TELEPHONE ENCOUNTER
Per chart review she is due for follow up. Left message for patient to return call  GARTH MedinaN, RN

## 2018-10-23 NOTE — TELEPHONE ENCOUNTER
LOV 9/28/18-    3. Generalized anxiety disorder F41.1 Poorly controlled  Start sertraline (ZOLOFT) 50 MG tablet  Follow up in one month  sertraline (ZOLOFT) 50 MG tablet     Left message for the patient to call the clinic.  Needs a f/u appt. Vanesa LOUIE RN

## 2018-10-26 NOTE — TELEPHONE ENCOUNTER
Refilled for one month.  Due for follow up - either in office or with a phone visit.  Belkis Proctor, CNP

## 2018-10-26 NOTE — TELEPHONE ENCOUNTER
5th attempt to contact pt.  VM left requesting return call to clinic.  Routing to provider to review.    Lucia NOLASCO RN

## 2018-12-15 ENCOUNTER — TELEPHONE (OUTPATIENT)
Dept: FAMILY MEDICINE | Facility: CLINIC | Age: 23
End: 2018-12-15

## 2018-12-15 DIAGNOSIS — F41.1 GENERALIZED ANXIETY DISORDER: ICD-10-CM

## 2018-12-15 DIAGNOSIS — F32.1 MODERATE MAJOR DEPRESSION (H): ICD-10-CM

## 2018-12-15 NOTE — LETTER
December 21, 2018      Aidee Pope  [address] [address] [place] [address]        Dear Aidee,       We have been unsuccessful at reaching you by phone.  Your sertraline medication has been refilled for one month as you are due for a follow-up with Belkis Proctor.  You may schedule this via a telephone visit (mention this when scheduling) or you can schedule an office visit.  Please call [phone] to schedule one of the above options to continue receiving refills of this medication.    Sincerely,        Belkis Proctor's Care Team          edmund

## 2018-12-17 NOTE — TELEPHONE ENCOUNTER
"Requested Prescriptions   Pending Prescriptions Disp Refills     sertraline (ZOLOFT) 50 MG tablet [Pharmacy Med Name: SERTRALINE HCL 50 MG TABLET] 30 tablet 0    Last Written Prescription Date:  10/26/18  Last Fill Quantity: 30,  # refills: 0   Last office visit: 10/15/2018 with prescribing provider:  Wilfredo   Future Office Visit:     Sig: TAKE 1 TABLET (50 MG) BY MOUTH DAILY DUE FOR FOLLOW UP    SSRIs Protocol Failed - 12/15/2018 12:46 AM       Failed - PHQ-9 score less than 5 in past 6 months    Please review last PHQ-9 score.          Passed - Patient is age 18 or older       Passed - No active pregnancy on record       Passed - No positive pregnancy test in last 12 months       Passed - Recent (6 mo) or future (30 days) visit within the authorizing provider's specialty    Patient had office visit in the last 6 months or has a visit in the next 30 days with authorizing provider or within the authorizing provider's specialty.  See \"Patient Info\" tab in inbasket, or \"Choose Columns\" in Meds & Orders section of the refill encounter.              "

## 2018-12-17 NOTE — TELEPHONE ENCOUNTER
Tried to call her to update PHQ9 by phone.   Left message on answering machine for patient to call back.  Angie Bernard RNC

## 2018-12-19 NOTE — TELEPHONE ENCOUNTER
Per 9/28/18 visit note:      Return in about 4 weeks (around 10/26/2018) for Depression/anxiety Recheck.       No appt scheduled.     Left message on answering machine for patient to call back. We need PHQ9, and she is due for office visit/ recheck.   Angie Bernard RNC

## 2018-12-19 NOTE — TELEPHONE ENCOUNTER
Left message for the patient to call the clinic. We have sent her a letter about needing office visit and alternative forms of office visits with cost.  Vanesa LOUIE RN

## 2018-12-20 NOTE — TELEPHONE ENCOUNTER
Unable to contact the patient.  Did contact her mother and asked to have patient call us back. Vanesa LOUIE RN

## 2018-12-20 NOTE — TELEPHONE ENCOUNTER
Routing refill request to provider for review/approval because:  Clover given x1 and patient did not follow up, please advise. We have attempted to contact patient and a clover refill was previously provided. Please advise.

## 2018-12-20 NOTE — TELEPHONE ENCOUNTER
Med refilled for one month.  Due for follow up - either in the office or with a phone visit.  Please call patient.  Belkis Proctor, CNP

## 2018-12-28 ENCOUNTER — OFFICE VISIT (OUTPATIENT)
Dept: FAMILY MEDICINE | Facility: CLINIC | Age: 23
End: 2018-12-28
Payer: COMMERCIAL

## 2018-12-28 VITALS
SYSTOLIC BLOOD PRESSURE: 110 MMHG | WEIGHT: 121 LBS | DIASTOLIC BLOOD PRESSURE: 72 MMHG | HEIGHT: 63 IN | OXYGEN SATURATION: 98 % | BODY MASS INDEX: 21.44 KG/M2 | TEMPERATURE: 98.7 F | RESPIRATION RATE: 12 BRPM | HEART RATE: 62 BPM

## 2018-12-28 DIAGNOSIS — F32.1 MODERATE MAJOR DEPRESSION (H): ICD-10-CM

## 2018-12-28 DIAGNOSIS — F41.1 GENERALIZED ANXIETY DISORDER: ICD-10-CM

## 2018-12-28 PROCEDURE — 99213 OFFICE O/P EST LOW 20 MIN: CPT | Performed by: NURSE PRACTITIONER

## 2018-12-28 ASSESSMENT — PATIENT HEALTH QUESTIONNAIRE - PHQ9
SUM OF ALL RESPONSES TO PHQ QUESTIONS 1-9: 3
5. POOR APPETITE OR OVEREATING: NOT AT ALL

## 2018-12-28 ASSESSMENT — ANXIETY QUESTIONNAIRES
7. FEELING AFRAID AS IF SOMETHING AWFUL MIGHT HAPPEN: NOT AT ALL
1. FEELING NERVOUS, ANXIOUS, OR ON EDGE: NOT AT ALL
6. BECOMING EASILY ANNOYED OR IRRITABLE: NOT AT ALL
2. NOT BEING ABLE TO STOP OR CONTROL WORRYING: NOT AT ALL
5. BEING SO RESTLESS THAT IT IS HARD TO SIT STILL: NOT AT ALL
IF YOU CHECKED OFF ANY PROBLEMS ON THIS QUESTIONNAIRE, HOW DIFFICULT HAVE THESE PROBLEMS MADE IT FOR YOU TO DO YOUR WORK, TAKE CARE OF THINGS AT HOME, OR GET ALONG WITH OTHER PEOPLE: SOMEWHAT DIFFICULT
3. WORRYING TOO MUCH ABOUT DIFFERENT THINGS: SEVERAL DAYS
GAD7 TOTAL SCORE: 1

## 2018-12-28 ASSESSMENT — PAIN SCALES - GENERAL: PAINLEVEL: NO PAIN (0)

## 2018-12-28 ASSESSMENT — MIFFLIN-ST. JEOR: SCORE: 1260.04

## 2018-12-28 NOTE — PATIENT INSTRUCTIONS
Continue Zoloft at current dose.    Return to clinic in 6 months for follow up. Return sooner if issues arise.

## 2018-12-28 NOTE — PROGRESS NOTES
SUBJECTIVE:   Aidee Pope is a 23 year old female who presents to clinic today for the following health issues:      Depression and Anxiety Follow-Up    Status since last visit: Improved didn't start med right away but has notice a difference    Other associated symptoms:None    Complicating factors:     Significant life event: No     Current substance abuse: None    PHQ 12/9/2016 9/28/2018 12/28/2018   PHQ-9 Total Score 5 7 3   Q9: Suicide Ideation Not at all Not at all Not at all     JERRY-7 SCORE 12/9/2016 9/28/2018 12/28/2018   Total Score - - -   Total Score 0 12 1       PHQ-9  English  PHQ-9   Any Language  JERRY-7  Suicide Assessment Five-step Evaluation and Treatment (SAFE-T)    Amount of exercise or physical activity: 2-3 days/week for an average of 30-45 minutes    Problems taking medications regularly: No    Medication side effects: none    Diet: regular (no restrictions)        Problem list and histories reviewed & adjusted, as indicated.  Further history obtained, clarified or corrected by provider:  Waited to start Zoloft until 3 weeks ago as she was concerned about potential side effects.  She is having some GI upset but tells me she can live with it.  Discussed how it can take 2-4 weeks for side effects such as this diminished.  Taking medication in the morning.      Patient Active Problem List   Diagnosis     Generalized anxiety disorder     Moderate major depression (H)     Chronic tension-type headache, not intractable     History reviewed. No pertinent surgical history.    Social History     Tobacco Use     Smoking status: Never Smoker     Smokeless tobacco: Never Used     Tobacco comment: EXPOSED AT HOME   Substance Use Topics     Alcohol use: Yes     Comment: less than 7 drinks per week     Family History   Problem Relation Age of Onset     Psychotic Disorder Father            Reviewed and updated as needed this visit by clinical staff  Tobacco  Allergies  Meds  Problems  Med Hx  Surg Hx  " Fam Hx  Soc Hx        Reviewed and updated as needed this visit by Provider  Tobacco  Allergies  Meds  Problems  Med Hx  Surg Hx  Fam Hx         ROS:  Constitutional, HEENT, cardiovascular, pulmonary, gi and gu systems are negative, except as otherwise noted.    OBJECTIVE:     /72 (BP Location: Right arm, Patient Position: Chair, Cuff Size: Adult Regular)   Pulse 62   Temp 98.7  F (37.1  C) (Tympanic)   Resp 12   Ht 1.588 m (5' 2.5\")   Wt 54.9 kg (121 lb)   LMP 12/21/2018 (Approximate)   SpO2 98%   Breastfeeding? No   BMI 21.78 kg/m    Body mass index is 21.78 kg/m .  GENERAL: healthy, alert and no distress  PSYCH: mentation appears normal, affect normal/bright    Diagnostic Test Results:  none     ASSESSMENT/PLAN:     ASSESSMENT:  1. Moderate major depression (H).  Some improvement noted with the Zoloft.  She recently started taking it about 3 weeks ago.  We will have her continue with the same dose and return to clinic in 6 months for follow-up.   2. Generalized anxiety disorder        PLAN:  Orders Placed This Encounter     sertraline (ZOLOFT) 50 MG tablet       Patient Instructions   Continue Zoloft at current dose.    Return to clinic in 6 months for follow up. Return sooner if issues arise.  Patient agrees with plan of care as outlined. Call or return to the clinic with any worsening of symptoms or no resolution. Medication side effects reviewed.      Susy Salcedo, NP, APRN CNP  [facility]  "

## 2018-12-29 ASSESSMENT — ANXIETY QUESTIONNAIRES: GAD7 TOTAL SCORE: 1

## 2019-06-03 ENCOUNTER — HOSPITAL ENCOUNTER (EMERGENCY)
Facility: CLINIC | Age: 24
Discharge: HOME | End: 2019-06-03
Attending: NURSE PRACTITIONER | Admitting: NURSE PRACTITIONER
Payer: OTHER MISCELLANEOUS

## 2019-06-03 VITALS
HEIGHT: 62 IN | BODY MASS INDEX: 22.08 KG/M2 | WEIGHT: 120 LBS | TEMPERATURE: 98.9 F | OXYGEN SATURATION: 100 % | RESPIRATION RATE: 16 BRPM

## 2019-06-03 DIAGNOSIS — S40.021A CONTUSION OF RIGHT UPPER EXTREMITY, INITIAL ENCOUNTER: ICD-10-CM

## 2019-06-03 PROCEDURE — G0463 HOSPITAL OUTPT CLINIC VISIT: HCPCS | Performed by: NURSE PRACTITIONER

## 2019-06-03 PROCEDURE — 99213 OFFICE O/P EST LOW 20 MIN: CPT | Mod: Z6 | Performed by: NURSE PRACTITIONER

## 2019-06-03 ASSESSMENT — MIFFLIN-ST. JEOR: SCORE: 1247.57

## 2019-06-03 ASSESSMENT — ENCOUNTER SYMPTOMS
JOINT SWELLING: 0
ARTHRALGIAS: 0

## 2019-06-03 NOTE — LETTER
Terra 3, 2019      To Whom It May Concern:      Aidee Pope was seen in our Urgent Care today, 06/03/19, for a soft tissue contusion or her right arm that occurred at work today. Plan as follows:    Ice packs, Tylenol/Ibuprofen for pain.  No work 6/3-6/4/2019.   May return to work 6/6/2019 without limitations.    Sincerely,        FARA Montgomery CNP

## 2019-06-03 NOTE — ED AVS SNAPSHOT
[facility]  [address] [address]  Phone:  [phone]  Fax:  [phone]                                    Aidee Pope   MRN: [id number]    Department:  [facility]   Date of Visit:  6/3/2019           After Visit Summary Signature Page    I have received my discharge instructions, and my questions have been answered. I have discussed any challenges I see with this plan with the nurse or doctor.    ..........................................................................................................................................  Patient/Patient Representative Signature      ..........................................................................................................................................  Patient Representative Print Name and Relationship to Patient    ..................................................               ................................................  Date                                   Time    ..........................................................................................................................................  Reviewed by Signature/Title    ...................................................              ..............................................  Date                                               Time          22EPIC Rev 08/18

## 2020-03-31 DIAGNOSIS — Z30.41 ENCOUNTER FOR SURVEILLANCE OF CONTRACEPTIVE PILLS: ICD-10-CM

## 2020-03-31 NOTE — TELEPHONE ENCOUNTER
"Requested Prescriptions   Pending Prescriptions Disp Refills     ALAYCEN 1/35 1-35 MG-MCG tablet [Pharmacy Med Name: ALYACEN 1-35 28 TABLET] 84 tablet 3     Sig: TAKE 1 TABLET BY MOUTH EVERY DAY       Contraceptives Protocol Failed - 3/31/2020  1:00 PM        Failed - Recent (12 mo) or future (30 days) visit within the authorizing provider's specialty     Patient has had an office visit with the authorizing provider or a provider within the authorizing providers department within the previous 12 mos or has a future within next 30 days. See \"Patient Info\" tab in inbasket, or \"Choose Columns\" in Meds & Orders section of the refill encounter.              Passed - Patient is not a current smoker if age is 35 or older        Passed - Medication is active on med list        Passed - No active pregnancy on record        Passed - No positive pregnancy test in past 12 months       Hormone Replacement Therapy Failed - 3/31/2020  1:00 PM        Failed - Blood pressure under 140/90 in past 12 months     BP Readings from Last 3 Encounters:   12/28/18 110/72   10/15/18 124/72   09/28/18 120/68                 Failed - Recent (12 mo) or future (30 days) visit within the authorizing provider's specialty     Patient has had an office visit with the authorizing provider or a provider within the authorizing providers department within the previous 12 mos or has a future within next 30 days. See \"Patient Info\" tab in inbasket, or \"Choose Columns\" in Meds & Orders section of the refill encounter.              Passed - Medication is active on med list        Passed - Patient is 18 years of age or older        Passed - No active pregnancy on record        Passed - No positive pregnancy test on record in past 12 months           Last Written Prescription Date:  9/28/2018  Last Fill Quantity: 84,  # refills: 3   Last office visit: 12/28/2018 with prescribing provider:  Matias  Future Office Visit:      "

## 2020-04-01 ENCOUNTER — VIRTUAL VISIT (OUTPATIENT)
Dept: FAMILY MEDICINE | Facility: CLINIC | Age: 25
End: 2020-04-01
Payer: COMMERCIAL

## 2020-04-01 DIAGNOSIS — Z30.41 ENCOUNTER FOR SURVEILLANCE OF CONTRACEPTIVE PILLS: ICD-10-CM

## 2020-04-01 DIAGNOSIS — F32.1 MODERATE MAJOR DEPRESSION (H): ICD-10-CM

## 2020-04-01 DIAGNOSIS — F41.1 GENERALIZED ANXIETY DISORDER: ICD-10-CM

## 2020-04-01 PROCEDURE — 96127 BRIEF EMOTIONAL/BEHAV ASSMT: CPT | Performed by: NURSE PRACTITIONER

## 2020-04-01 PROCEDURE — 99214 OFFICE O/P EST MOD 30 MIN: CPT | Mod: TEL | Performed by: NURSE PRACTITIONER

## 2020-04-01 RX ORDER — NORETHINDRONE AND ETHINYL ESTRADIOL 1 MG-35MCG
1 KIT ORAL DAILY
Qty: 84 TABLET | Refills: 3 | Status: SHIPPED | OUTPATIENT
Start: 2020-04-01 | End: 2022-01-10

## 2020-04-01 RX ORDER — NORETHINDRONE AND ETHINYL ESTRADIOL 1 MG-35MCG
KIT ORAL
Qty: 84 TABLET | Refills: 3 | OUTPATIENT
Start: 2020-04-01

## 2020-04-01 ASSESSMENT — PATIENT HEALTH QUESTIONNAIRE - PHQ9
5. POOR APPETITE OR OVEREATING: SEVERAL DAYS
SUM OF ALL RESPONSES TO PHQ QUESTIONS 1-9: 6

## 2020-04-01 ASSESSMENT — ANXIETY QUESTIONNAIRES
1. FEELING NERVOUS, ANXIOUS, OR ON EDGE: SEVERAL DAYS
5. BEING SO RESTLESS THAT IT IS HARD TO SIT STILL: NOT AT ALL
3. WORRYING TOO MUCH ABOUT DIFFERENT THINGS: MORE THAN HALF THE DAYS
IF YOU CHECKED OFF ANY PROBLEMS ON THIS QUESTIONNAIRE, HOW DIFFICULT HAVE THESE PROBLEMS MADE IT FOR YOU TO DO YOUR WORK, TAKE CARE OF THINGS AT HOME, OR GET ALONG WITH OTHER PEOPLE: SOMEWHAT DIFFICULT
2. NOT BEING ABLE TO STOP OR CONTROL WORRYING: SEVERAL DAYS
7. FEELING AFRAID AS IF SOMETHING AWFUL MIGHT HAPPEN: NOT AT ALL
GAD7 TOTAL SCORE: 6
6. BECOMING EASILY ANNOYED OR IRRITABLE: SEVERAL DAYS

## 2020-04-01 NOTE — TELEPHONE ENCOUNTER
Last prescription written a year and a half ago - this indicates to me that she hasn't been taking it consistently. Needs a phone visit.  Belkis Proctor, CNP

## 2020-04-01 NOTE — PROGRESS NOTES
"Subjective     Aidee Pope is a 24 year old female who is being evaluated via a billable telephone visit.      The patient has been notified of following:     \"This telephone visit will be conducted via a call between you and your physician/provider. We have found that certain health care needs can be provided without the need for a physical exam.  This service lets us provide the care you need with a short phone conversation.  If a prescription is necessary we can send it directly to your pharmacy.  If lab work is needed we can place an order for that and you can then stop by our lab to have the test done at a later time.    If during the course of the call the physician/provider feels a telephone visit is not appropriate, you will not be charged for this service.\"     Patient has given verbal consent for Telephone visit?  Yes    Aidee Pope complains of   Chief Complaint   Patient presents with     Refill Request     birth control     Anxiety   possibly restart medication    ALLERGIES  Amoxicillin and Bee venom    Medication Followup of birth control pill    Taking Medication as prescribed: yes    Side Effects:  None    Medication Helping Symptoms:  Yes    Has been on it consistently now for the past 2 months       Depression and Anxiety Follow-Up-  Discuss Restarting  medication    How are you doing with your depression since your last visit? Worsened     How are you doing with your anxiety since your last visit?  Worsened     Stopped sertraline 6 months ago    Are you having other symptoms that might be associated with depression or anxiety? No    Have you had a significant life event? No     Do you have any concerns with your use of alcohol or other drugs? Yes:  alcohol    Social History     Tobacco Use     Smoking status: Never Smoker     Smokeless tobacco: Never Used     Tobacco comment: EXPOSED AT HOME   Substance Use Topics     Alcohol use: Yes     Comment: couple drinks every other day     Drug " use: No     PHQ 9/28/2018 12/28/2018 4/1/2020   PHQ-9 Total Score 7 3 6   Q9: Thoughts of better off dead/self-harm past 2 weeks Not at all Not at all Not at all     JERRY-7 SCORE 9/28/2018 12/28/2018 4/1/2020   Total Score - - -   Total Score 12 1 6           Suicide Assessment Five-step Evaluation and Treatment (SAFE-T)          Reviewed and updated as needed this visit by Provider         Review of Systems          Objective   Reported vitals:  There were no vitals taken for this visit.   Psych: Alert and oriented times 3; coherent speech, normal   rate and volume, able to articulate logical thoughts, able   to abstract reason, no tangential thoughts, no hallucinations   or delusions              Assessment/Plan:  1. Moderate major depression (H)  Poorly controlled off medication. Restart sertraline and follow up in one month.  Discussed cessation of alcohol.  - sertraline (ZOLOFT) 50 MG tablet; Take 1/2 tablet daily for one week, then increase to 1 tablet daily.  Dispense: 90 tablet; Refill: 3    2. Generalized anxiety disorder  Poorly controlled off medication. Restart sertraline and follow up in one month.  - sertraline (ZOLOFT) 50 MG tablet; Take 1/2 tablet daily for one week, then increase to 1 tablet daily.  Dispense: 90 tablet; Refill: 3    3. Encounter for surveillance of contraceptive pills  Patient would like to continue current OCP.  - norethindrone-ethinyl estradiol (ALAYCEN 1/35) 1-35 MG-MCG tablet; Take 1 tablet by mouth daily  Dispense: 84 tablet; Refill: 3    Return in about 4 weeks (around 4/29/2020) for Depression/anxiety Recheck.      Phone call duration:  10 minutes    FARA Dill CNP

## 2020-04-02 ASSESSMENT — ANXIETY QUESTIONNAIRES: GAD7 TOTAL SCORE: 6

## 2020-04-10 ENCOUNTER — ANCILLARY PROCEDURE (OUTPATIENT)
Dept: GENERAL RADIOLOGY | Facility: CLINIC | Age: 25
End: 2020-04-10
Attending: NURSE PRACTITIONER
Payer: COMMERCIAL

## 2020-04-10 ENCOUNTER — OFFICE VISIT (OUTPATIENT)
Dept: FAMILY MEDICINE | Facility: CLINIC | Age: 25
End: 2020-04-10
Payer: COMMERCIAL

## 2020-04-10 ENCOUNTER — TELEPHONE (OUTPATIENT)
Dept: FAMILY MEDICINE | Facility: CLINIC | Age: 25
End: 2020-04-10

## 2020-04-10 VITALS
TEMPERATURE: 97.9 F | DIASTOLIC BLOOD PRESSURE: 68 MMHG | WEIGHT: 119.4 LBS | HEIGHT: 63 IN | OXYGEN SATURATION: 99 % | SYSTOLIC BLOOD PRESSURE: 114 MMHG | HEART RATE: 105 BPM | RESPIRATION RATE: 14 BRPM | BODY MASS INDEX: 21.16 KG/M2

## 2020-04-10 DIAGNOSIS — S89.92XA KNEE INJURY, LEFT, INITIAL ENCOUNTER: ICD-10-CM

## 2020-04-10 DIAGNOSIS — S89.92XA KNEE INJURY, LEFT, INITIAL ENCOUNTER: Primary | ICD-10-CM

## 2020-04-10 PROCEDURE — 99213 OFFICE O/P EST LOW 20 MIN: CPT | Performed by: NURSE PRACTITIONER

## 2020-04-10 PROCEDURE — 73562 X-RAY EXAM OF KNEE 3: CPT | Mod: LT

## 2020-04-10 ASSESSMENT — MIFFLIN-ST. JEOR: SCORE: 1261.84

## 2020-04-10 NOTE — LETTER
[facility]  [address] [address]  [phone]          April 10, 2020    RE:  Aidee Pope                                                                                                                                                       [address] [place] [place] [address]            To whom it may concern:    Aidee Pope was seen in my clinic today. She may return to work Monday April 13, 2020.        Sincerely,          Belkis Proctor, CNP

## 2020-04-10 NOTE — TELEPHONE ENCOUNTER
Reason for Call:  Other RTW Note/Letter    Detailed comments: Patient needs RTW note that patient can return to work on Monday, 4/13/2020    Phone Number Patient can be reached at: Home number on file [phone] (home)    Best Time: Any    Can we leave a detailed message on this number? YES    Call taken on 4/10/2020 at 2:57 PM by Evy Cerda
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
## 2020-04-13 ENCOUNTER — TELEPHONE (OUTPATIENT)
Dept: FAMILY MEDICINE | Facility: CLINIC | Age: 25
End: 2020-04-13

## 2020-04-13 DIAGNOSIS — M25.562 LEFT KNEE PAIN, UNSPECIFIED CHRONICITY: Primary | ICD-10-CM

## 2020-04-13 NOTE — TELEPHONE ENCOUNTER
L.M. to ask pt if letter from Dr. Alatorre is ok and if she wants letter faxed or picked up?    Signed letter @ [facility] desk

## 2020-04-13 NOTE — TELEPHONE ENCOUNTER
Pt return call.  She would like to  letter @ [facility]  and also wants to  actual crutches to use.  Please call patient and advise.

## 2020-04-13 NOTE — TELEPHONE ENCOUNTER
Letter is signed. Please ask pt if this is what she needed.   If not put the info on a new letter and I will sign it.   Fax as needed.     Jamey Alatorre MD

## 2020-04-13 NOTE — TELEPHONE ENCOUNTER
S-(situation): Left knee pain    B-(background): 04/10/20 [place]    ICD-10-CM      1. Knee injury, left, initial encounter  S89.92XA XR Knee Left 3 Views     May continue to wear brace if helpful.  Ice for 15 minutes several times daily.   NSAID (ibuprofen 600 mg every 6 hours or aleve 2 tabs twice daily) for pain and inflammation.  Return for Follow up if not improving in 2 weeks.   Consider MRI at that time.     A-(assessment): patient c/o left knee pain. States no improvement. Getting shin splints more now from trying to compensate while walking. Would like crutches-hurting more with walking.     Would like extension on work note for 04/16/20 and crutch order     R-(recommendations): routing to provider pool as Esthela is on [facility] this week.     GARTH ChongN, RN

## 2020-04-13 NOTE — TELEPHONE ENCOUNTER
Reason for Call:  Work note    Detailed comments: Patient is asking for a note to return to work on Thursday 04/16.  Patient saw ALEXANDRU Proctor on 04/10 for knee injury.  Note was given to return to work today, however patient states that pain is a little worse.  Also asking for crutches.  No note in visit about this.    Phone Number Patient can be reached at: Home number on file [phone] (home)    Best Time: Any    Can we leave a detailed message on this number? YES    Call taken on 4/13/2020 at 9:29 AM by Myah Gustafson

## 2020-04-13 NOTE — LETTER
[facility]  [address] [address]  Phone: [phone]    April 13, 2020      Aidee Pope  [address] [place] [address] [place] [address]      Dear Ms. Pope    Your work restrictions should be extended to 4-16-20.   You should be allowed to use crutches at work.     Sincerely,    / Jamey Alatorre MD

## 2020-04-14 ENCOUNTER — TRANSFERRED RECORDS (OUTPATIENT)
Dept: HEALTH INFORMATION MANAGEMENT | Facility: CLINIC | Age: 25
End: 2020-04-14

## 2021-01-01 NOTE — ED PROVIDER NOTES
"  History     Chief Complaint   Patient presents with     Arm Pain     rt arm pain, stuck in belt at work     HPI  Aidee Pope is a 23 year old female who suffered a work injury to her right arm today.  Patient is works as a  and was moving items along the conveyor belt and her proximal forearm caught in the conveyor belt pinching her arm.    Allergies:  Allergies   Allergen Reactions     Amoxicillin Rash     Bee Venom        Problem List:    Patient Active Problem List    Diagnosis Date Noted     Chronic tension-type headache, not intractable 12/09/2016     Priority: Medium     Generalized anxiety disorder 07/18/2011     Priority: Medium     Diagnosis updated by automated process. Provider to review and confirm.       Moderate major depression (H) 07/18/2011     Priority: Medium        Past Medical History:    No past medical history on file.    Past Surgical History:    No past surgical history on file.    Family History:    Family History   Problem Relation Age of Onset     Psychotic Disorder Father        Social History:  Marital Status:  Single [1]  Social History     Tobacco Use     Smoking status: Never Smoker     Smokeless tobacco: Never Used     Tobacco comment: EXPOSED AT HOME   Substance Use Topics     Alcohol use: Yes     Comment: less than 7 drinks per week     Drug use: No        Medications:      norethindrone-ethinyl estradiol (ALAYCEN 1/35) 1-35 MG-MCG per tablet   sertraline (ZOLOFT) 50 MG tablet   sertraline (ZOLOFT) 50 MG tablet         Review of Systems   Musculoskeletal: Negative for arthralgias and joint swelling.        Right arm pain.   Skin: Positive for rash.       Physical Exam   Heart Rate: 72  Temp: 98.9  F (37.2  C)  Resp: 16  Height: 157.5 cm (5' 2\")  Weight: 54.4 kg (120 lb)  SpO2: 100 %      Physical Exam   Appearance: Alert and oriented.  Pleasant.  No acute distress.  Right arm: As noted in the picture below there is erythema where her arm was pinched.  Patient " otherwise has full range of motion of her wrist and elbow.          ED Course        Procedures               No results found for this or any previous visit (from the past 24 hour(s)).    Medications - No data to display    Assessments & Plan (with Medical Decision Making)     As noted in the picture above patient suffered a soft tissue contusion to her right arm after it was caught in a conveyor belt at work today.  Patient works as a .  Patient instructed to use ice packs, and Tylenol/ibuprofen for pain.  Patient provided with a workability note.  Patient instructed to return for any worsening.  I have reviewed the nursing notes.    I have reviewed the findings, diagnosis, plan and need for follow up with the patient.         Medication List      There are no discharge medications for this visit.         Final diagnoses:   Contusion of right upper extremity, initial encounter       6/3/2019   [facility] EMERGENCY DEPARTMENT     Davida Rain APRN CNP  06/03/19 2023     I will SWITCH the dose or number of times a day I take the medications listed below when I get home from the hospital:  None

## 2021-04-11 DIAGNOSIS — F41.1 GENERALIZED ANXIETY DISORDER: ICD-10-CM

## 2021-04-11 DIAGNOSIS — F32.1 MODERATE MAJOR DEPRESSION (H): ICD-10-CM

## 2021-04-15 NOTE — TELEPHONE ENCOUNTER
Routing refill request to provider for review/approval because:  Last seen 4/10/20 with PHQ-9 of 6.  Mak.  Mahesh

## 2021-04-15 NOTE — TELEPHONE ENCOUNTER
Refilled for 1 month.  Please call and remind patient that she is due for her annual recheck with me.  She is also due for physical and Pap, we could do at the same time.  Belkis Proctor, CNP

## 2021-05-04 DIAGNOSIS — F41.1 GENERALIZED ANXIETY DISORDER: ICD-10-CM

## 2021-05-04 DIAGNOSIS — F32.1 MODERATE MAJOR DEPRESSION (H): ICD-10-CM

## 2021-05-04 NOTE — TELEPHONE ENCOUNTER
"Requested Prescriptions   Pending Prescriptions Disp Refills     sertraline (ZOLOFT) 50 MG tablet 30 tablet 0     Sig: Take 1 tablet (50 mg) by mouth daily       SSRIs Protocol Failed - 5/4/2021 11:52 AM        Failed - PHQ-9 score less than 5 in past 6 months     Please review last PHQ-9 score.           Failed - Recent (6 mo) or future (30 days) visit within the authorizing provider's specialty     Patient had office visit in the last 6 months or has a visit in the next 30 days with authorizing provider or within the authorizing provider's specialty.  See \"Patient Info\" tab in inbasket, or \"Choose Columns\" in Meds & Orders section of the refill encounter.            Passed - Medication is active on med list        Passed - Patient is age 18 or older        Passed - No active pregnancy on record        Passed - No positive pregnancy test in last 12 months             "

## 2021-05-07 NOTE — TELEPHONE ENCOUNTER
"See 4/11/2021 Refill notes. She was advised then she needs Physical and PAP at that time. Called her again, and she says about the sertraline: \"I have more than I thought I did.\"  She says she will be leaving this weekend on vacation and will be gone for a couple weeks, so will schedule when she gets back.     Lucia NOLASCO RN, BSN      "

## 2021-12-29 ENCOUNTER — TELEPHONE (OUTPATIENT)
Dept: FAMILY MEDICINE | Facility: CLINIC | Age: 26
End: 2021-12-29
Payer: COMMERCIAL

## 2021-12-29 NOTE — TELEPHONE ENCOUNTER
Reason for Call: Confirm pregnancy     Detailed comments: Took a at home pregnancy test that was positive and wants to confirm. She left a message for the OB Intake today but has not heard back yet.     Last menstrual cycle started on 11/27.    Phone Number Patient can be reached at: Home number on file [phone] (home)    Best Time: anytime    Can we leave a detailed message on this number? YES    Call taken on 12/29/2021 at 1:38 PM by Tania Lockwood

## 2021-12-30 NOTE — TELEPHONE ENCOUNTER
Pt calling - wants to confirm pregnancy.  Asking for a blood pregnancy test to be ordered by PCP.    Please advise.    Thanks-    -Angelina Fraser  [facility]

## 2021-12-30 NOTE — TELEPHONE ENCOUNTER
Home tests are accurate - if it is positive, than she is pregnant.  There is no need to come in for a blood test.    Make an appointment with OB to start obstetric care.  Belkis Proctor, CNP

## 2021-12-31 NOTE — TELEPHONE ENCOUNTER
Patient notified of Belkis CHAUDHARI CNP's message, and verbalized understanding. Provided with phone number for scheduling with OB.    Ysabel Ramires RN  [facility]

## 2022-01-10 ENCOUNTER — PRENATAL OFFICE VISIT (OUTPATIENT)
Dept: OBGYN | Facility: CLINIC | Age: 27
End: 2022-01-10
Payer: COMMERCIAL

## 2022-01-10 DIAGNOSIS — Z34.00 PRENATAL CARE, FIRST PREGNANCY: ICD-10-CM

## 2022-01-10 PROCEDURE — 99207 PR NO CHARGE NURSE ONLY: CPT | Performed by: OBSTETRICS & GYNECOLOGY

## 2022-01-10 RX ORDER — PRENATAL VIT/IRON FUM/FOLIC AC 27MG-0.8MG
1 TABLET ORAL DAILY
COMMUNITY
Start: 2021-11-15 | End: 2024-04-24

## 2022-01-10 NOTE — PROGRESS NOTES
Lifestyle and nutrition teaching completed   [facility] OB Intake Nurse    Patient supplied answers from flow sheet for:  Prenatal OB Questionnaire.  Past Medical History  Have you ever recieved care for your mental health? : (!) Yes  Have you ever been in a major accident or suffered serious trauma?: No  Within the last year, has anyone hit, slapped, kicked or otherwise hurt you?: No  In the last year, has anyone forced you to have sex when you didn't want to?: No    Past Medical History 2   Have you ever received a blood transfusion?: No  Would you accept a blood transfusion if was medically recommended?: Yes  Does anyone in your home smoke?: No   Is your blood type Rh negative?: Unknown  Have you ever ?: No  Have you been hospitalized for a nonsurgical reason excluding normal delivery?: (!) Yes (high fever age 9)  Have you ever had an abnormal pap smear?: No    Past Medical History (Continued)  Do you have a history of abnormalities of the uterus?: No  Did your mother take MELANIA or any other hormones when she was pregnant with you?: No  Do you have any other problems we have not asked about which you feel may be important to this pregnancy?: No

## 2022-01-11 ENCOUNTER — APPOINTMENT (OUTPATIENT)
Dept: OBGYN | Facility: CLINIC | Age: 27
End: 2022-01-11
Payer: COMMERCIAL

## 2022-01-24 ENCOUNTER — PRENATAL OFFICE VISIT (OUTPATIENT)
Dept: OBGYN | Facility: CLINIC | Age: 27
End: 2022-01-24
Payer: COMMERCIAL

## 2022-01-24 VITALS
RESPIRATION RATE: 16 BRPM | HEIGHT: 62 IN | DIASTOLIC BLOOD PRESSURE: 61 MMHG | TEMPERATURE: 98.4 F | HEART RATE: 91 BPM | SYSTOLIC BLOOD PRESSURE: 112 MMHG | WEIGHT: 125 LBS | BODY MASS INDEX: 23 KG/M2

## 2022-01-24 DIAGNOSIS — Z34.01 ENCOUNTER FOR PRENATAL CARE IN FIRST TRIMESTER OF FIRST PREGNANCY: Primary | ICD-10-CM

## 2022-01-24 LAB
ABO/RH(D): NORMAL
ALBUMIN UR-MCNC: NEGATIVE MG/DL
AMORPH CRY #/AREA URNS HPF: ABNORMAL /HPF
ANTIBODY SCREEN: NEGATIVE
APPEARANCE UR: ABNORMAL
BACTERIA #/AREA URNS HPF: ABNORMAL /HPF
BILIRUB UR QL STRIP: NEGATIVE
COLOR UR AUTO: YELLOW
ERYTHROCYTE [DISTWIDTH] IN BLOOD BY AUTOMATED COUNT: 11.8 % (ref 10–15)
GLUCOSE UR STRIP-MCNC: NEGATIVE MG/DL
HBV SURFACE AG SERPL QL IA: NONREACTIVE
HCT VFR BLD AUTO: 38.7 % (ref 35–47)
HCV AB SERPL QL IA: NONREACTIVE
HGB BLD-MCNC: 12.8 G/DL (ref 11.7–15.7)
HGB UR QL STRIP: NEGATIVE
HIV 1+2 AB+HIV1 P24 AG SERPL QL IA: NONREACTIVE
KETONES UR STRIP-MCNC: NEGATIVE MG/DL
LEUKOCYTE ESTERASE UR QL STRIP: NEGATIVE
MCH RBC QN AUTO: 31.8 PG (ref 26.5–33)
MCHC RBC AUTO-ENTMCNC: 33.1 G/DL (ref 31.5–36.5)
MCV RBC AUTO: 96 FL (ref 78–100)
NITRATE UR QL: NEGATIVE
PH UR STRIP: 7 [PH] (ref 5–7)
PLATELET # BLD AUTO: 318 10E3/UL (ref 150–450)
RBC # BLD AUTO: 4.02 10E6/UL (ref 3.8–5.2)
RBC #/AREA URNS AUTO: ABNORMAL /HPF
SP GR UR STRIP: 1.01 (ref 1–1.03)
SPECIMEN EXPIRATION DATE: NORMAL
SQUAMOUS #/AREA URNS AUTO: ABNORMAL /LPF
T PALLIDUM AB SER QL: NONREACTIVE
UROBILINOGEN UR STRIP-ACNC: 0.2 E.U./DL
WBC # BLD AUTO: 6.3 10E3/UL (ref 4–11)
WBC #/AREA URNS AUTO: ABNORMAL /HPF

## 2022-01-24 PROCEDURE — 86850 RBC ANTIBODY SCREEN: CPT | Performed by: OBSTETRICS & GYNECOLOGY

## 2022-01-24 PROCEDURE — 36415 COLL VENOUS BLD VENIPUNCTURE: CPT | Performed by: OBSTETRICS & GYNECOLOGY

## 2022-01-24 PROCEDURE — 86803 HEPATITIS C AB TEST: CPT | Performed by: OBSTETRICS & GYNECOLOGY

## 2022-01-24 PROCEDURE — 86901 BLOOD TYPING SEROLOGIC RH(D): CPT | Performed by: OBSTETRICS & GYNECOLOGY

## 2022-01-24 PROCEDURE — 87389 HIV-1 AG W/HIV-1&-2 AB AG IA: CPT | Performed by: OBSTETRICS & GYNECOLOGY

## 2022-01-24 PROCEDURE — 85027 COMPLETE CBC AUTOMATED: CPT | Performed by: OBSTETRICS & GYNECOLOGY

## 2022-01-24 PROCEDURE — 81001 URINALYSIS AUTO W/SCOPE: CPT | Performed by: OBSTETRICS & GYNECOLOGY

## 2022-01-24 PROCEDURE — 87491 CHLMYD TRACH DNA AMP PROBE: CPT | Performed by: OBSTETRICS & GYNECOLOGY

## 2022-01-24 PROCEDURE — 87340 HEPATITIS B SURFACE AG IA: CPT | Performed by: OBSTETRICS & GYNECOLOGY

## 2022-01-24 PROCEDURE — 86762 RUBELLA ANTIBODY: CPT | Performed by: OBSTETRICS & GYNECOLOGY

## 2022-01-24 PROCEDURE — G0145 SCR C/V CYTO,THINLAYER,RESCR: HCPCS | Performed by: OBSTETRICS & GYNECOLOGY

## 2022-01-24 PROCEDURE — 99207 PR FIRST OB VISIT: CPT | Performed by: OBSTETRICS & GYNECOLOGY

## 2022-01-24 PROCEDURE — 87086 URINE CULTURE/COLONY COUNT: CPT | Performed by: OBSTETRICS & GYNECOLOGY

## 2022-01-24 PROCEDURE — 87591 N.GONORRHOEAE DNA AMP PROB: CPT | Performed by: OBSTETRICS & GYNECOLOGY

## 2022-01-24 PROCEDURE — 86900 BLOOD TYPING SEROLOGIC ABO: CPT | Performed by: OBSTETRICS & GYNECOLOGY

## 2022-01-24 PROCEDURE — 86780 TREPONEMA PALLIDUM: CPT | Performed by: OBSTETRICS & GYNECOLOGY

## 2022-01-24 ASSESSMENT — MIFFLIN-ST. JEOR: SCORE: 1255.25

## 2022-01-24 NOTE — NURSING NOTE
"Initial /61 (BP Location: Right arm, Patient Position: Chair, Cuff Size: Adult Regular)   Pulse 91   Temp 98.4  F (36.9  C) (Tympanic)   Resp 16   Ht 1.575 m (5' 2\")   Wt 56.7 kg (125 lb)   LMP 11/22/2021   Breastfeeding No   BMI 22.86 kg/m   Estimated body mass index is 22.86 kg/m  as calculated from the following:    Height as of this encounter: 1.575 m (5' 2\").    Weight as of this encounter: 56.7 kg (125 lb). .      "

## 2022-01-24 NOTE — PROGRESS NOTES
Discussed physician coverage, tertiary support, diet, exercise, weight gain, schedule of visits, routine and indicated ultrasounds, childbirth education and antepartum testing for certain birth defects.  Encouraged patient to review contents of Prenatal Breastfeeding Education Toolkit. Offered opportunity to answer questions regarding the importance of skin to skin contact, early initiation of exclusive breastfeeding for the first six months and rooming in while in the hospital.    Syphilis is a sexually transmitted disease that can cause birth defects in the babies of untreated mothers. Every pregnant patient is tested for syphilis early in each pregnancy as part of the routine lab work. The [place] [facility] of [facility] has seen an increase in the rate of syphilis in [place]. The [facility] now recommends testing for syphilis 2 times during a pregnancy, the new prenatal visit, and 28 weeks or when admitted for delivery. Patient accepts lab testing for syphilis.    Group call support for deliveries was discussed, as well as tertiary support in event of high risk issues within [facility].    Discussed current state of COVID-19 in pregnancy, when to seek out emergency care, how to self care at home with milder symptoms.  Discussed COVID vaccine, latest safety information, potential benefits in pregnancy to mom and baby (lower risk for hospitalization and death)        Options for  testing for birth defects were discussed with the patient, generally including CVS testing, Quad screen serum testing, nuchal lucency/blood marker testing, genetic amniocentesis, NIPT testing  and/or level 2 ultrasound.    Current issues include: nausea, mild    Past medical, surgical, social and family histories reviewed on OB questionaire and included on episode summary.  Pertinent review of systems items stated above. See OB questionaire for pertinent components of HPI.    Past Medical History:   Diagnosis  "Date     Chickenpox      See epic chart    Past Surgical History:   Procedure Laterality Date     WRIST FRACTURE SURGERY Right      See epic chart    Patient Active Problem List    Diagnosis Date Noted     Prenatal care, first pregnancy 01/10/2022     Priority: Medium     Chronic tension-type headache, not intractable 12/09/2016     Priority: Medium     Generalized anxiety disorder 07/18/2011     Priority: Medium     Diagnosis updated by automated process. Provider to review and confirm.       Moderate major depression (H) 07/18/2011     Priority: Medium       OBJECTIVE: /61 (BP Location: Right arm, Patient Position: Chair, Cuff Size: Adult Regular)   Pulse 91   Temp 98.4  F (36.9  C) (Tympanic)   Resp 16   Ht 1.575 m (5' 2\")   Wt 56.7 kg (125 lb)   LMP 11/22/2021   Breastfeeding No   BMI 22.86 kg/m      GENERAL APPEARANCE: healthy, alert and no distress  ABDOMEN:  soft, nontender, no hepato-splenomegaly or hernias  PELVIC:  EGBUS:  within normal limits  VAGINA:  normoestrogenic, well-supported, no unusual discharge  CERVIX:  smooth, non-friable, no gross lesions, thin-layer PAP was taken  UTERUS:  anteverted, enlarged, 8 weeks size and smooth  ADNEXAE:  non-tender, no masses palpable, no cul de sac nodularity     NECK: no adenopathy, no asymmetry, masses, or scars and thyroid normal to palpation     RESP: lungs clear to auscultation , respiratory effort WNL     CV: regular rates and rhythm, normal S1 S2, no S3 or S4 and no murmur, click or rub -     SKIN: no suspicious lesions or rashes     PSYCH: mentation appears normal. and affect normal/bright, oriented to person/place/time     LYMPHATICS: No axillary, cervical, inguinal, or supraclavicular nodes    Transvaginal ultrasound was performed. A live single intrauterine pregnancy was seen.  CRL=1.33 cm, c/w 7 weeks, 4 days.  EDC by sono =09/08/22+++  EDC by LMP=08/29/22    Fetal heart motion was visualized. THP=543 bpm                    ASSESSMENT:    " ICD-10-CM    1. Encounter for prenatal care in first trimester of first pregnancy  Z34.01 UA with Microscopic     Urine Culture     Chlamydia trachomatis/Neisseria gonorrhoeae by PCR     Pap screen reflex to HPV if ASCUS - recommend age 25 - 29     Urine Microscopic Exam         PLAN: see orders and OB problem list annotations  Offered NIPT  EDC adjusted to 09/08/22    Ofelia Price MD

## 2022-01-25 LAB
BACTERIA UR CULT: NORMAL
C TRACH DNA SPEC QL PROBE+SIG AMP: NEGATIVE
N GONORRHOEA DNA SPEC QL NAA+PROBE: NEGATIVE
RUBV IGG SERPL QL IA: 1.59 INDEX
RUBV IGG SERPL QL IA: POSITIVE

## 2022-01-26 LAB
BKR LAB AP GYN ADEQUACY: NORMAL
BKR LAB AP GYN INTERPRETATION: NORMAL
BKR LAB AP HPV REFLEX: NORMAL
BKR LAB AP LMP: NORMAL
BKR LAB AP PREVIOUS ABNORMAL: NORMAL
PATH REPORT.COMMENTS IMP SPEC: NORMAL
PATH REPORT.COMMENTS IMP SPEC: NORMAL
PATH REPORT.RELEVANT HX SPEC: NORMAL

## 2022-02-21 ENCOUNTER — PRENATAL OFFICE VISIT (OUTPATIENT)
Dept: OBGYN | Facility: CLINIC | Age: 27
End: 2022-02-21
Payer: COMMERCIAL

## 2022-02-21 VITALS
BODY MASS INDEX: 23.23 KG/M2 | SYSTOLIC BLOOD PRESSURE: 102 MMHG | WEIGHT: 127 LBS | TEMPERATURE: 98.1 F | HEART RATE: 78 BPM | DIASTOLIC BLOOD PRESSURE: 63 MMHG

## 2022-02-21 DIAGNOSIS — Z34.01 ENCOUNTER FOR PRENATAL CARE IN FIRST TRIMESTER OF FIRST PREGNANCY: Primary | ICD-10-CM

## 2022-02-21 LAB — INTERPRETATION: NORMAL

## 2022-02-21 PROCEDURE — 36415 COLL VENOUS BLD VENIPUNCTURE: CPT | Performed by: ADVANCED PRACTICE MIDWIFE

## 2022-02-21 PROCEDURE — 99207 PR PRENATAL VISIT: CPT | Performed by: ADVANCED PRACTICE MIDWIFE

## 2022-02-21 NOTE — NURSING NOTE
"Initial /63 (BP Location: Right arm, Patient Position: Chair, Cuff Size: Adult Regular)   Pulse 78   Temp 98.1  F (36.7  C) (Tympanic)   Wt 57.6 kg (127 lb)   LMP 11/22/2021   Breastfeeding No   BMI 23.23 kg/m   Estimated body mass index is 23.23 kg/m  as calculated from the following:    Height as of 1/24/22: 1.575 m (5' 2\").    Weight as of this encounter: 57.6 kg (127 lb). .    Jazmin Cheek MA    "

## 2022-02-21 NOTE — PROGRESS NOTES
Here with partner.  Feeling well. Denies any leaking of fluid, vaginal bleeding, regular uterine contractions, or headaches or other concerns.  They desire NIPT.  Ordered.    Reviewed to call for contractions, loss of fluid, vaginal bleeding or any other questions or concerns.    RTC in 4 weeks.  Stephie Pool, JULIANA, APRN, CNM

## 2022-02-27 ENCOUNTER — HEALTH MAINTENANCE LETTER (OUTPATIENT)
Age: 27
End: 2022-02-27

## 2022-03-01 LAB — SCANNED LAB RESULT: NORMAL

## 2022-03-28 ENCOUNTER — PRENATAL OFFICE VISIT (OUTPATIENT)
Dept: OBGYN | Facility: CLINIC | Age: 27
End: 2022-03-28
Payer: COMMERCIAL

## 2022-03-28 VITALS
WEIGHT: 128 LBS | DIASTOLIC BLOOD PRESSURE: 52 MMHG | HEART RATE: 78 BPM | TEMPERATURE: 97 F | BODY MASS INDEX: 23.41 KG/M2 | SYSTOLIC BLOOD PRESSURE: 98 MMHG

## 2022-03-28 DIAGNOSIS — Z34.02 ENCOUNTER FOR PRENATAL CARE IN SECOND TRIMESTER OF FIRST PREGNANCY: Primary | ICD-10-CM

## 2022-03-28 PROCEDURE — 99207 PR PRENATAL VISIT: CPT | Performed by: OBSTETRICS & GYNECOLOGY

## 2022-03-28 NOTE — NURSING NOTE
"Initial BP 98/52 (BP Location: Right arm, Patient Position: Chair, Cuff Size: Adult Regular)   Pulse 78   Temp 97  F (36.1  C) (Tympanic)   Wt 58.1 kg (128 lb)   LMP 11/22/2021   Breastfeeding No   BMI 23.41 kg/m   Estimated body mass index is 23.41 kg/m  as calculated from the following:    Height as of 1/24/22: 1.575 m (5' 2\").    Weight as of this encounter: 58.1 kg (128 lb). .    Jazmin Cheek MA    "

## 2022-03-28 NOTE — PROGRESS NOTES
"[facility]   OB/GYN Clinic    CC: Return OB     Subjective:    Aidee is a 26 year old  at 16w4d who presents for return OB visit. She reports feeling well. Denies uterine cramping, vaginal bleeding or leaking, dysuria. +fetal movement.     Objective:  BP 98/52 (BP Location: Right arm, Patient Position: Chair, Cuff Size: Adult Regular)   Pulse 78   Temp 97  F (36.1  C) (Tympanic)   Wt 58.1 kg (128 lb)   LMP 2021   Breastfeeding No   BMI 23.41 kg/m      Estimated body mass index is 23.41 kg/m  as calculated from the following:    Height as of 22: 1.575 m (5' 2\").    Weight as of this encounter: 58.1 kg (128 lb).    Physical Exam:  Gen: Pleasant, talkative female in no apparent distress   Respiratory: breathing comfortably on room air   Cardiac: Warm and well-perfused.   GI: Abd soft and non-tender, gravid  MSK: Grossly normal movement of all four extremities  Psych: mood and affect bright   Lower extremity: edema not present     Fetal dop tones: 150s bpm    Assessment/Plan:   26 year old  at 16w4d who presents for follow-up OB visit.   1) New OB lab; T&S, CBC, HIV, RPR, HepBsAg, Hep B antibody, rubella, GC/Chlam WNL. Plan for 3rd tri lab at 28wks.   2) Genetics testing: NIPT WNL  3) NIPT WNL   4) Medication review: no changes, continue prenatal vitamin      Return to clinic in 4 weeks.     Malena Tolliver MD   3/28/2022 11:22 AM     "

## 2022-04-25 ENCOUNTER — HOSPITAL ENCOUNTER (OUTPATIENT)
Dept: ULTRASOUND IMAGING | Facility: CLINIC | Age: 27
Discharge: HOME | End: 2022-04-25
Attending: OBSTETRICS & GYNECOLOGY | Admitting: OBSTETRICS & GYNECOLOGY
Payer: COMMERCIAL

## 2022-04-25 DIAGNOSIS — Z34.02 ENCOUNTER FOR PRENATAL CARE IN SECOND TRIMESTER OF FIRST PREGNANCY: ICD-10-CM

## 2022-04-25 PROCEDURE — 76805 OB US >/= 14 WKS SNGL FETUS: CPT

## 2022-04-26 DIAGNOSIS — Z34.02 ENCOUNTER FOR PRENATAL CARE IN SECOND TRIMESTER OF FIRST PREGNANCY: Primary | ICD-10-CM

## 2022-04-29 ENCOUNTER — HOSPITAL ENCOUNTER (OUTPATIENT)
Dept: ULTRASOUND IMAGING | Facility: CLINIC | Age: 27
Discharge: HOME | End: 2022-04-29
Attending: OBSTETRICS & GYNECOLOGY | Admitting: OBSTETRICS & GYNECOLOGY
Payer: COMMERCIAL

## 2022-04-29 DIAGNOSIS — Z34.02 ENCOUNTER FOR PRENATAL CARE IN SECOND TRIMESTER OF FIRST PREGNANCY: ICD-10-CM

## 2022-04-29 PROCEDURE — 76816 OB US FOLLOW-UP PER FETUS: CPT

## 2022-05-02 ENCOUNTER — TRANSCRIBE ORDERS (OUTPATIENT)
Dept: MATERNAL FETAL MEDICINE | Facility: HOSPITAL | Age: 27
End: 2022-05-02
Payer: COMMERCIAL

## 2022-05-02 ENCOUNTER — PRENATAL OFFICE VISIT (OUTPATIENT)
Dept: OBGYN | Facility: CLINIC | Age: 27
End: 2022-05-02
Payer: COMMERCIAL

## 2022-05-02 VITALS
BODY MASS INDEX: 25.14 KG/M2 | SYSTOLIC BLOOD PRESSURE: 94 MMHG | RESPIRATION RATE: 16 BRPM | HEIGHT: 62 IN | HEART RATE: 83 BPM | TEMPERATURE: 97.9 F | WEIGHT: 136.6 LBS | DIASTOLIC BLOOD PRESSURE: 55 MMHG

## 2022-05-02 DIAGNOSIS — O26.90 PREGNANCY RELATED CONDITION: Primary | ICD-10-CM

## 2022-05-02 DIAGNOSIS — Z34.02 ENCOUNTER FOR PRENATAL CARE IN SECOND TRIMESTER OF FIRST PREGNANCY: Primary | ICD-10-CM

## 2022-05-02 PROCEDURE — 99207 PR PRENATAL VISIT: CPT | Performed by: OBSTETRICS & GYNECOLOGY

## 2022-05-02 ASSESSMENT — PATIENT HEALTH QUESTIONNAIRE - PHQ9: SUM OF ALL RESPONSES TO PHQ QUESTIONS 1-9: 0

## 2022-05-02 NOTE — PROGRESS NOTES
"[facility]   OB/GYN Clinic     CC: Return OB      Subjective:     Aidee is a 26 year old  at 21w4d who presents for return OB visit. She reports feeling well. Denies uterine cramping, vaginal bleeding or leaking, dysuria. +fetal movement.      Objective:  BP 94/55 (BP Location: Left arm, Patient Position: Chair, Cuff Size: Adult Regular)   Pulse 83   Temp 97.9  F (36.6  C) (Tympanic)   Resp 16   Ht 1.575 m (5' 2\")   Wt 62 kg (136 lb 9.6 oz)   LMP 2021   Breastfeeding No   BMI 24.98 kg/m        Physical Exam:  Gen: Pleasant, talkative female in no apparent distress   Respiratory: breathing comfortably on room air   Cardiac: Warm and well-perfused.   GI: Abd soft and non-tender, gravid  MSK: Grossly normal movement of all four extremities  Psych: mood and affect bright   Lower extremity: edema not present      Fetal dop tones: 150s bpm     Assessment/Plan:   26 year old  at 21w4d who presents for follow-up OB visit.   1) New OB lab; T&S, CBC, HIV, RPR, HepBsAg, Hep B antibody, rubella, GC/Chlam WNL. Plan for 3rd tri lab at 28wks.   2) Genetics testing: NIPT WNL  3) NIPT WNL   4) Medication review: no changes, continue prenatal vitamin   5) anatomy scan with succenturiate lobe over fundus with insert in thinnest portion- MFM consult ordered to rule out velamentous cord    Return to clinic in 4 weeks.     Malena Tolliver MD   2022 1:10 PM      "

## 2022-05-02 NOTE — LETTER
[facility] [place]  [address] [address]  Phone: [phone]      May 2, 2022      Aidee Pope  [address] [place] [address]              To whom it may concern:    Aidee Pope is being seen in our clinic for prenatal care.  Her Estimated Date of Delivery: Sep 8, 2022. Patient can continue working with 15 lbs weight restriction.  Sincerely,    Malena Tolliver MD

## 2022-05-02 NOTE — NURSING NOTE
"Initial BP 94/55 (BP Location: Left arm, Patient Position: Chair, Cuff Size: Adult Regular)   Pulse 83   Temp 97.9  F (36.6  C) (Tympanic)   Resp 16   Ht 1.575 m (5' 2\")   Wt 62 kg (136 lb 9.6 oz)   LMP 11/22/2021   Breastfeeding No   BMI 24.98 kg/m   Estimated body mass index is 24.98 kg/m  as calculated from the following:    Height as of this encounter: 1.575 m (5' 2\").    Weight as of this encounter: 62 kg (136 lb 9.6 oz). .    Bety Villanueva, DOMENICA    "

## 2022-05-04 ENCOUNTER — PRE VISIT (OUTPATIENT)
Dept: MATERNAL FETAL MEDICINE | Facility: HOSPITAL | Age: 27
End: 2022-05-04
Payer: COMMERCIAL

## 2022-05-09 ENCOUNTER — OFFICE VISIT (OUTPATIENT)
Dept: MATERNAL FETAL MEDICINE | Facility: HOSPITAL | Age: 27
End: 2022-05-09
Attending: OBSTETRICS & GYNECOLOGY
Payer: COMMERCIAL

## 2022-05-09 ENCOUNTER — ANCILLARY PROCEDURE (OUTPATIENT)
Dept: ULTRASOUND IMAGING | Facility: HOSPITAL | Age: 27
End: 2022-05-09
Attending: OBSTETRICS & GYNECOLOGY
Payer: COMMERCIAL

## 2022-05-09 DIAGNOSIS — O26.90 PREGNANCY RELATED CONDITION: ICD-10-CM

## 2022-05-09 DIAGNOSIS — O43.122 VELAMENTOUS INSERTION OF UMBILICAL CORD IN SECOND TRIMESTER: Primary | ICD-10-CM

## 2022-05-09 PROCEDURE — 99207 PR NO CHARGE LOS: CPT | Performed by: OBSTETRICS & GYNECOLOGY

## 2022-05-09 PROCEDURE — 76811 OB US DETAILED SNGL FETUS: CPT

## 2022-05-09 PROCEDURE — 76811 OB US DETAILED SNGL FETUS: CPT | Mod: 26 | Performed by: OBSTETRICS & GYNECOLOGY

## 2022-05-09 NOTE — PROGRESS NOTES
"Please see \"Imaging\" tab under Chart Review for full details.    Myah Blancas MD  Maternal Fetal Medicine    "

## 2022-05-12 ENCOUNTER — TELEPHONE (OUTPATIENT)
Dept: OBGYN | Facility: CLINIC | Age: 27
End: 2022-05-12
Payer: COMMERCIAL

## 2022-05-12 DIAGNOSIS — O43.129 VELAMENTOUS INSERTION OF UMBILICAL CORD: ICD-10-CM

## 2022-05-12 DIAGNOSIS — Z34.02 ENCOUNTER FOR PRENATAL CARE IN SECOND TRIMESTER OF FIRST PREGNANCY: Primary | ICD-10-CM

## 2022-05-12 NOTE — TELEPHONE ENCOUNTER
Pt calling after her M appt on 5/9. Was advised to get monthly ultrasounds done with [facility], but no orders present so not able to schedule.    Pt would like to set up as soon as possible, Please call [phone] to discuss ultrasound needs and scheduling.

## 2022-05-12 NOTE — TELEPHONE ENCOUNTER
Pt notified of below.  Pt reports understanding.  Pt does not have further questions or concerns.    Geena Richards   Ob/Gyn Clinic  RN

## 2022-05-12 NOTE — TELEPHONE ENCOUNTER
Please review Maternal Fetal Medicine Clinic recommendations and advise on future orders.    Geena Richards   Ob/Gyn Clinic  RN

## 2022-05-25 ENCOUNTER — PRENATAL OFFICE VISIT (OUTPATIENT)
Dept: OBGYN | Facility: CLINIC | Age: 27
End: 2022-05-25
Payer: COMMERCIAL

## 2022-05-25 VITALS
RESPIRATION RATE: 16 BRPM | HEART RATE: 78 BPM | HEIGHT: 62 IN | DIASTOLIC BLOOD PRESSURE: 60 MMHG | WEIGHT: 126.9 LBS | SYSTOLIC BLOOD PRESSURE: 109 MMHG | TEMPERATURE: 97.4 F | BODY MASS INDEX: 23.35 KG/M2

## 2022-05-25 DIAGNOSIS — Z34.02 ENCOUNTER FOR PRENATAL CARE IN SECOND TRIMESTER OF FIRST PREGNANCY: ICD-10-CM

## 2022-05-25 DIAGNOSIS — Z34.02 ENCOUNTER FOR SUPERVISION OF NORMAL FIRST PREGNANCY IN SECOND TRIMESTER: Primary | ICD-10-CM

## 2022-05-25 LAB
ERYTHROCYTE [DISTWIDTH] IN BLOOD BY AUTOMATED COUNT: 12.3 % (ref 10–15)
GLUCOSE 1H P 50 G GLC PO SERPL-MCNC: 100 MG/DL (ref 70–129)
HCT VFR BLD AUTO: 34.4 % (ref 35–47)
HGB BLD-MCNC: 11.5 G/DL (ref 11.7–15.7)
MCH RBC QN AUTO: 32.1 PG (ref 26.5–33)
MCHC RBC AUTO-ENTMCNC: 33.4 G/DL (ref 31.5–36.5)
MCV RBC AUTO: 96 FL (ref 78–100)
PLATELET # BLD AUTO: 266 10E3/UL (ref 150–450)
RBC # BLD AUTO: 3.58 10E6/UL (ref 3.8–5.2)
WBC # BLD AUTO: 7.7 10E3/UL (ref 4–11)

## 2022-05-25 PROCEDURE — 36415 COLL VENOUS BLD VENIPUNCTURE: CPT | Performed by: OBSTETRICS & GYNECOLOGY

## 2022-05-25 PROCEDURE — 82950 GLUCOSE TEST: CPT | Performed by: OBSTETRICS & GYNECOLOGY

## 2022-05-25 PROCEDURE — 86780 TREPONEMA PALLIDUM: CPT | Performed by: OBSTETRICS & GYNECOLOGY

## 2022-05-25 PROCEDURE — 99207 PR PRENATAL VISIT: CPT | Performed by: OBSTETRICS & GYNECOLOGY

## 2022-05-25 PROCEDURE — 85027 COMPLETE CBC AUTOMATED: CPT | Performed by: OBSTETRICS & GYNECOLOGY

## 2022-05-25 NOTE — PROGRESS NOTES
Concerns: Succenturiate lobe.  Serial growth ultrasounds recommended with biopsy physical profile at 36 weeks to be done weekly  Doing well.  No concerns today.  No vaginal bleeding, no contractions, no leakage of fluid  No nausea/vomiting. No heartburn  No vaginal discharge. No dysuria.   No headache, vision changes, lower extremity swelling, upper abdominal pain, chest pain, shortness of breath  Reportable signs and symptoms discussed.  Tdap planned next visit  Discussed PTL, PROM, and when to call or come in.  Normal anatomy ultrasound.  RTC 4 weeks.  GTT and labs today       Chirag Hebert MD

## 2022-05-25 NOTE — NURSING NOTE
"Initial /60 (BP Location: Right arm, Patient Position: Chair, Cuff Size: Adult Regular)   Pulse 78   Temp 97.4  F (36.3  C) (Tympanic)   Resp 16   Ht 1.575 m (5' 2\")   Wt 57.6 kg (126 lb 14.4 oz)   LMP 11/22/2021   BMI 23.21 kg/m   Estimated body mass index is 23.21 kg/m  as calculated from the following:    Height as of this encounter: 1.575 m (5' 2\").    Weight as of this encounter: 57.6 kg (126 lb 14.4 oz). .      "

## 2022-05-26 ENCOUNTER — TELEPHONE (OUTPATIENT)
Dept: OBGYN | Facility: CLINIC | Age: 27
End: 2022-05-26
Payer: COMMERCIAL

## 2022-05-26 LAB — T PALLIDUM AB SER QL: NONREACTIVE

## 2022-05-26 NOTE — TELEPHONE ENCOUNTER
Return call to patient.  Spoke with patient on the phone.    Patient advised that she passed her glucose screening.  Hgb good at 11.5    Pt in agreement and reports understanding.    Geena Richards   Ob/Gyn Clinic  RN

## 2022-05-26 NOTE — TELEPHONE ENCOUNTER
Reason for Call:  Request for results:    Name of test or procedure: Glucose    Date of test of procedure: 5-25-22    Location of the test or procedure: [place]    OK to leave the result message on voice mail or with a family member? YES    Phone number Patient can be reached at:  Cell number on file:    Telephone Information:   Mobile [phone]       Additional comments: Pt looking for results - figured they would be back by now    Call taken on 5/26/2022 at 3:37 PM by Angelina Fraser

## 2022-06-13 ENCOUNTER — PRENATAL OFFICE VISIT (OUTPATIENT)
Dept: OBGYN | Facility: CLINIC | Age: 27
End: 2022-06-13
Payer: COMMERCIAL

## 2022-06-13 ENCOUNTER — HOSPITAL ENCOUNTER (OUTPATIENT)
Dept: ULTRASOUND IMAGING | Facility: CLINIC | Age: 27
Discharge: HOME | End: 2022-06-13
Attending: OBSTETRICS & GYNECOLOGY | Admitting: OBSTETRICS & GYNECOLOGY
Payer: COMMERCIAL

## 2022-06-13 VITALS
RESPIRATION RATE: 16 BRPM | BODY MASS INDEX: 24.92 KG/M2 | HEART RATE: 70 BPM | TEMPERATURE: 98.1 F | HEIGHT: 62 IN | WEIGHT: 135.4 LBS | DIASTOLIC BLOOD PRESSURE: 63 MMHG | SYSTOLIC BLOOD PRESSURE: 107 MMHG

## 2022-06-13 DIAGNOSIS — O43.129 VELAMENTOUS INSERTION OF UMBILICAL CORD: ICD-10-CM

## 2022-06-13 DIAGNOSIS — Z34.02 ENCOUNTER FOR PRENATAL CARE IN SECOND TRIMESTER OF FIRST PREGNANCY: ICD-10-CM

## 2022-06-13 DIAGNOSIS — Z34.02 ENCOUNTER FOR PRENATAL CARE IN SECOND TRIMESTER OF FIRST PREGNANCY: Primary | ICD-10-CM

## 2022-06-13 PROCEDURE — 99207 PR PRENATAL VISIT: CPT | Performed by: OBSTETRICS & GYNECOLOGY

## 2022-06-13 PROCEDURE — 76816 OB US FOLLOW-UP PER FETUS: CPT

## 2022-06-13 PROCEDURE — 90715 TDAP VACCINE 7 YRS/> IM: CPT | Performed by: OBSTETRICS & GYNECOLOGY

## 2022-06-13 PROCEDURE — 90471 IMMUNIZATION ADMIN: CPT | Performed by: OBSTETRICS & GYNECOLOGY

## 2022-06-13 NOTE — NURSING NOTE
"Initial /63 (BP Location: Left arm, Patient Position: Chair, Cuff Size: Adult Regular)   Pulse 70   Temp 98.1  F (36.7  C) (Tympanic)   Resp 16   Ht 1.575 m (5' 2\")   Wt 61.4 kg (135 lb 6.4 oz)   LMP 11/22/2021   BMI 24.76 kg/m   Estimated body mass index is 24.76 kg/m  as calculated from the following:    Height as of this encounter: 1.575 m (5' 2\").    Weight as of this encounter: 61.4 kg (135 lb 6.4 oz). .      "

## 2022-06-13 NOTE — PROGRESS NOTES
"[facility]   OB/GYN Clinic     CC: Return OB      Subjective:     Aidee is a 26 year old  at 27w4d who presents for return OB visit. She reports feeling well. Denies uterine cramping, vaginal bleeding or leaking, dysuria. +fetal movement.      Objective:  /63 (BP Location: Left arm, Patient Position: Chair, Cuff Size: Adult Regular)   Pulse 70   Temp 98.1  F (36.7  C) (Tympanic)   Resp 16   Ht 1.575 m (5' 2\")   Wt 61.4 kg (135 lb 6.4 oz)   LMP 2021   BMI 24.76 kg/m       Physical Exam:  Gen: Pleasant, talkative female in no apparent distress   Respiratory: breathing comfortably on room air   Cardiac: Warm and well-perfused.   GI: Abd soft and non-tender, gravid  MSK: Grossly normal movement of all four extremities  Psych: mood and affect bright   Lower extremity: edema not present      See OB flowsheet      Assessment/Plan:   26 year old  at 27w4d who presents for follow-up OB visit.   1) New OB lab; T&S, CBC, HIV, RPR, HepBsAg, Hep B antibody, rubella, GC/Chlam WNL. Plan for 3rd tri lab at 28wks.   2) Genetics testing: NIPT WNL  3) NIPT WNL   4) Medication review: no changes, continue prenatal vitamin   5) anatomy scan with succenturiate lobe over fundus with insert in thinnest portion- MFM consult ordered to rule out velamentous cord - growth normal      Return to clinic in 2 weeks. Labor and FM precautions.      Alexia Samuel MD       "

## 2022-06-28 ENCOUNTER — PRENATAL OFFICE VISIT (OUTPATIENT)
Dept: OBGYN | Facility: CLINIC | Age: 27
End: 2022-06-28
Payer: COMMERCIAL

## 2022-06-28 VITALS
HEART RATE: 95 BPM | RESPIRATION RATE: 16 BRPM | DIASTOLIC BLOOD PRESSURE: 69 MMHG | TEMPERATURE: 97.2 F | BODY MASS INDEX: 25.67 KG/M2 | HEIGHT: 62 IN | WEIGHT: 139.5 LBS | SYSTOLIC BLOOD PRESSURE: 113 MMHG

## 2022-06-28 DIAGNOSIS — Z34.03 ENCOUNTER FOR SUPERVISION OF NORMAL FIRST PREGNANCY IN THIRD TRIMESTER: Primary | ICD-10-CM

## 2022-06-28 PROCEDURE — 99207 PR PRENATAL VISIT: CPT | Performed by: OBSTETRICS & GYNECOLOGY

## 2022-06-28 NOTE — PROGRESS NOTES
Concerns: doing well  Doing well.  No concerns today.  No vaginal bleeding, LOF.  No contractions.  Reportable signs and symptoms discussed.  Discussed kick counts and fetal movement.  Discussed PTL, PROM, and when to call or come in.  RTC 2 weeks.    Chirag Hebert MD

## 2022-07-12 ENCOUNTER — PRENATAL OFFICE VISIT (OUTPATIENT)
Dept: OBGYN | Facility: CLINIC | Age: 27
End: 2022-07-12
Payer: COMMERCIAL

## 2022-07-12 VITALS
HEART RATE: 77 BPM | TEMPERATURE: 98.3 F | BODY MASS INDEX: 26.3 KG/M2 | RESPIRATION RATE: 12 BRPM | DIASTOLIC BLOOD PRESSURE: 69 MMHG | WEIGHT: 142.9 LBS | SYSTOLIC BLOOD PRESSURE: 112 MMHG | HEIGHT: 62 IN

## 2022-07-12 DIAGNOSIS — O43.123 VELAMENTOUS INSERTION OF UMBILICAL CORD IN THIRD TRIMESTER: ICD-10-CM

## 2022-07-12 DIAGNOSIS — Z34.03 ENCOUNTER FOR PRENATAL CARE IN THIRD TRIMESTER OF FIRST PREGNANCY: Primary | ICD-10-CM

## 2022-07-12 DIAGNOSIS — O43.193 PLACENTA SUCCENTURIATA IN THIRD TRIMESTER: ICD-10-CM

## 2022-07-12 PROCEDURE — 99207 PR PRENATAL VISIT: CPT | Performed by: OBSTETRICS & GYNECOLOGY

## 2022-07-12 NOTE — NURSING NOTE
"Initial /69 (BP Location: Left arm, Patient Position: Sitting, Cuff Size: Adult Regular)   Pulse 77   Temp 98.3  F (36.8  C) (Tympanic)   Resp 12   Ht 1.575 m (5' 2\")   Wt 64.8 kg (142 lb 14.4 oz)   LMP 11/22/2021   BMI 26.14 kg/m   Estimated body mass index is 26.14 kg/m  as calculated from the following:    Height as of this encounter: 1.575 m (5' 2\").    Weight as of this encounter: 64.8 kg (142 lb 14.4 oz). .      "

## 2022-07-12 NOTE — PROGRESS NOTES
"[facility]    Return OB Note    CC: Return OB     Subjective:  Aidee is a 26 year old  at 31w5d   Denies vaginal bleeding, loss of fluid, or regular contractions. Good fetal movement.  Complaints today: None    Objective:  /69 (BP Location: Left arm, Patient Position: Sitting, Cuff Size: Adult Regular)   Pulse 77   Temp 98.3  F (36.8  C) (Tympanic)   Resp 12   Ht 1.575 m (5' 2\")   Wt 64.8 kg (142 lb 14.4 oz)   LMP 2021   BMI 26.14 kg/m      Fundal height: 32cm  FHT: 130bpm    Assessment/Plan:   Encounter Diagnoses   Name Primary?     Encounter for prenatal care in third trimester of first pregnancy Yes     Velamentous insertion of umbilical cord in third trimester      Placenta succenturiata in third trimester        IUP at 31w5d  -Mid trimester labs  -Abnormal placenta: Per MFM: \"The main bulk of the placenta is anterior-fundal with a posterior succenturiate lobe. The placental cord insertion is velamentous and between the two placental masses.\" Recommend serial growth US and weekly BPPs at 36 weeks, ordered today.  -S/p TDap  -Strict return precautions given    RTC 2 weeks    Yaneli Chavez DO    "

## 2022-07-15 ENCOUNTER — HOSPITAL ENCOUNTER (OUTPATIENT)
Dept: ULTRASOUND IMAGING | Facility: CLINIC | Age: 27
Discharge: HOME | End: 2022-07-15
Attending: OBSTETRICS & GYNECOLOGY | Admitting: OBSTETRICS & GYNECOLOGY
Payer: COMMERCIAL

## 2022-07-15 DIAGNOSIS — Z34.02 ENCOUNTER FOR PRENATAL CARE IN SECOND TRIMESTER OF FIRST PREGNANCY: ICD-10-CM

## 2022-07-15 DIAGNOSIS — O43.129 VELAMENTOUS INSERTION OF UMBILICAL CORD: ICD-10-CM

## 2022-07-15 PROCEDURE — 76816 OB US FOLLOW-UP PER FETUS: CPT

## 2022-07-27 ENCOUNTER — TELEPHONE (OUTPATIENT)
Dept: OBGYN | Facility: CLINIC | Age: 27
End: 2022-07-27

## 2022-07-27 ENCOUNTER — PRENATAL OFFICE VISIT (OUTPATIENT)
Dept: OBGYN | Facility: CLINIC | Age: 27
End: 2022-07-27
Payer: COMMERCIAL

## 2022-07-27 VITALS
DIASTOLIC BLOOD PRESSURE: 69 MMHG | HEIGHT: 62 IN | SYSTOLIC BLOOD PRESSURE: 110 MMHG | RESPIRATION RATE: 12 BRPM | BODY MASS INDEX: 26.92 KG/M2 | WEIGHT: 146.3 LBS | TEMPERATURE: 98.1 F | HEART RATE: 108 BPM

## 2022-07-27 DIAGNOSIS — O43.193 PLACENTA SUCCENTURIATA IN THIRD TRIMESTER: ICD-10-CM

## 2022-07-27 DIAGNOSIS — Z34.03 ENCOUNTER FOR PRENATAL CARE IN THIRD TRIMESTER OF FIRST PREGNANCY: ICD-10-CM

## 2022-07-27 DIAGNOSIS — O43.123 VELAMENTOUS INSERTION OF UMBILICAL CORD IN THIRD TRIMESTER: Primary | ICD-10-CM

## 2022-07-27 PROCEDURE — 99207 PR PRENATAL VISIT: CPT | Performed by: OBSTETRICS & GYNECOLOGY

## 2022-07-27 NOTE — NURSING NOTE
"Initial /69 (BP Location: Right arm, Patient Position: Sitting, Cuff Size: Adult Regular)   Pulse 108   Temp 98.1  F (36.7  C) (Tympanic)   Resp 12   Ht 1.575 m (5' 2\")   Wt 66.4 kg (146 lb 4.8 oz)   LMP 11/22/2021   BMI 26.76 kg/m   Estimated body mass index is 26.76 kg/m  as calculated from the following:    Height as of this encounter: 1.575 m (5' 2\").    Weight as of this encounter: 66.4 kg (146 lb 4.8 oz). .      "

## 2022-07-27 NOTE — TELEPHONE ENCOUNTER
Completed paperwork was given to Yaneli Chavez DO to sign.    -Angelina Fraser  Clinic Station [place]

## 2022-08-02 NOTE — TELEPHONE ENCOUNTER
Paperwork completed and signed by Dr. Chavez and  faxed to [phone] [employer] per pt request.      Karla Shane   Clinic Station    [facility]  [phone]

## 2022-08-10 ENCOUNTER — PRENATAL OFFICE VISIT (OUTPATIENT)
Dept: OBGYN | Facility: CLINIC | Age: 27
End: 2022-08-10
Payer: COMMERCIAL

## 2022-08-10 ENCOUNTER — HOSPITAL ENCOUNTER (OUTPATIENT)
Dept: ULTRASOUND IMAGING | Facility: CLINIC | Age: 27
Discharge: HOME | End: 2022-08-10
Attending: OBSTETRICS & GYNECOLOGY | Admitting: OBSTETRICS & GYNECOLOGY
Payer: COMMERCIAL

## 2022-08-10 VITALS
SYSTOLIC BLOOD PRESSURE: 111 MMHG | DIASTOLIC BLOOD PRESSURE: 71 MMHG | TEMPERATURE: 96.9 F | BODY MASS INDEX: 28.06 KG/M2 | HEART RATE: 81 BPM | WEIGHT: 152.5 LBS | HEIGHT: 62 IN | RESPIRATION RATE: 16 BRPM

## 2022-08-10 DIAGNOSIS — O43.123 VELAMENTOUS INSERTION OF UMBILICAL CORD IN THIRD TRIMESTER: ICD-10-CM

## 2022-08-10 DIAGNOSIS — O43.193 PLACENTA SUCCENTURIATA IN THIRD TRIMESTER: ICD-10-CM

## 2022-08-10 DIAGNOSIS — Z34.03 ENCOUNTER FOR PRENATAL CARE IN THIRD TRIMESTER OF FIRST PREGNANCY: Primary | ICD-10-CM

## 2022-08-10 PROCEDURE — 87653 STREP B DNA AMP PROBE: CPT | Performed by: OBSTETRICS & GYNECOLOGY

## 2022-08-10 PROCEDURE — 76816 OB US FOLLOW-UP PER FETUS: CPT

## 2022-08-10 PROCEDURE — 99207 PR PRENATAL VISIT: CPT | Performed by: OBSTETRICS & GYNECOLOGY

## 2022-08-10 NOTE — PROGRESS NOTES
Concerns:   Doing well.  No concerns today.  No vaginal bleeding, LOF.  No contractions.  Cervix is posterior, long, closed and soft.  Transverse position confirmed by Leopold maneuvers and cervical exam.  Reportable signs and symptoms discussed.  Discussed kick counts and fetal movement.  Discussed PTL, PROM, and when to call or come in.  RTC 2 weeks.    Chirag Hebert MD

## 2022-08-11 LAB — GP B STREP DNA SPEC QL NAA+PROBE: NEGATIVE

## 2022-08-15 ENCOUNTER — HOSPITAL ENCOUNTER (OUTPATIENT)
Dept: ULTRASOUND IMAGING | Facility: CLINIC | Age: 27
Discharge: HOME | End: 2022-08-15
Attending: OBSTETRICS & GYNECOLOGY | Admitting: OBSTETRICS & GYNECOLOGY
Payer: COMMERCIAL

## 2022-08-15 ENCOUNTER — PRENATAL OFFICE VISIT (OUTPATIENT)
Dept: OBGYN | Facility: CLINIC | Age: 27
End: 2022-08-15
Payer: COMMERCIAL

## 2022-08-15 VITALS
DIASTOLIC BLOOD PRESSURE: 82 MMHG | BODY MASS INDEX: 28.72 KG/M2 | TEMPERATURE: 96.9 F | HEART RATE: 75 BPM | SYSTOLIC BLOOD PRESSURE: 120 MMHG | WEIGHT: 157 LBS

## 2022-08-15 DIAGNOSIS — O43.123 VELAMENTOUS INSERTION OF UMBILICAL CORD IN THIRD TRIMESTER: ICD-10-CM

## 2022-08-15 DIAGNOSIS — O43.193 PLACENTA SUCCENTURIATA IN THIRD TRIMESTER: ICD-10-CM

## 2022-08-15 DIAGNOSIS — Z34.93 PRENATAL CARE, THIRD TRIMESTER: Primary | ICD-10-CM

## 2022-08-15 PROCEDURE — 99207 PR PRENATAL VISIT: CPT | Performed by: OBSTETRICS & GYNECOLOGY

## 2022-08-15 PROCEDURE — 76819 FETAL BIOPHYS PROFIL W/O NST: CPT

## 2022-08-15 NOTE — NURSING NOTE
"Initial /82 (BP Location: Left arm, Patient Position: Chair, Cuff Size: Adult Regular)   Pulse 75   Temp 96.9  F (36.1  C) (Tympanic)   Wt 71.2 kg (157 lb)   LMP 11/22/2021   Breastfeeding No   BMI 28.72 kg/m   Estimated body mass index is 28.72 kg/m  as calculated from the following:    Height as of 8/10/22: 1.575 m (5' 2\").    Weight as of this encounter: 71.2 kg (157 lb). .    Jazmin Cheek MA    "

## 2022-08-15 NOTE — PROGRESS NOTES
"[facility]     Return OB Note     CC: Return OB      Subjective:  Aidee is a 26 year old  at 36w4d     Denies vaginal bleeding, loss of fluid, or regular contractions. Good fetal movement.  Complaints today: None     Objective:  /82 (BP Location: Left arm, Patient Position: Chair, Cuff Size: Adult Regular)   Pulse 75   Temp 96.9  F (36.1  C) (Tympanic)   Wt 71.2 kg (157 lb)   LMP 2021   Breastfeeding No   BMI 28.72 kg/m        Fundal height: 36w4d   FHT: 140s     Assessment/Plan:     IUP at 36w4d   -Mid trimester labs  -Abnormal placenta: Per MFM: \"The main bulk of the placenta is anterior-fundal with a posterior succenturiate lobe. The placental cord insertion is velamentous and between the two placental masses.\"  On ultrasound today, patient found to be breech.  Discussed with patient's concern with attempting ECV with breech presentation given the velamentous cord insertion between the injury at low and main placental body.  Discussed that at this point I would consider awaiting 39 weeks to see if fetus returns to vertex presentation.  Did briefly discuss the option of attempting ECV under spinal anesthesia prior to proceeding with  given the ability to perform stat/immediate  delivery should he does show signs of stress.  Patient and her partner are not sure that they would be interested in this given the velamentous cord and discussed that this is very reasonable.  We will therefore await to see what presentation is at 39 weeks but tentatively plan for primary .  -Growth US 7/15 EFW 21%. Watch fundal heights and growth US as S<D  -S/p TDap  -Strict return precautions given     RTC 1 weeks    Malena Tolliver MD   8/15/2022 2:18 PM     "

## 2022-08-22 ENCOUNTER — TELEPHONE (OUTPATIENT)
Dept: OBGYN | Facility: CLINIC | Age: 27
End: 2022-08-22

## 2022-08-22 ENCOUNTER — MYC MEDICAL ADVICE (OUTPATIENT)
Dept: OBGYN | Facility: CLINIC | Age: 27
End: 2022-08-22

## 2022-08-22 ENCOUNTER — PREP FOR PROCEDURE (OUTPATIENT)
Dept: OBGYN | Facility: CLINIC | Age: 27
End: 2022-08-22

## 2022-08-22 ENCOUNTER — HOSPITAL ENCOUNTER (OUTPATIENT)
Dept: ULTRASOUND IMAGING | Facility: CLINIC | Age: 27
Discharge: HOME | End: 2022-08-22
Attending: OBSTETRICS & GYNECOLOGY | Admitting: OBSTETRICS & GYNECOLOGY
Payer: COMMERCIAL

## 2022-08-22 ENCOUNTER — PRENATAL OFFICE VISIT (OUTPATIENT)
Dept: OBGYN | Facility: CLINIC | Age: 27
End: 2022-08-22
Payer: COMMERCIAL

## 2022-08-22 VITALS
WEIGHT: 159 LBS | HEIGHT: 62 IN | HEART RATE: 76 BPM | RESPIRATION RATE: 16 BRPM | DIASTOLIC BLOOD PRESSURE: 87 MMHG | SYSTOLIC BLOOD PRESSURE: 127 MMHG | TEMPERATURE: 98.2 F | BODY MASS INDEX: 29.26 KG/M2

## 2022-08-22 DIAGNOSIS — O43.123 VELAMENTOUS INSERTION OF UMBILICAL CORD IN THIRD TRIMESTER: ICD-10-CM

## 2022-08-22 DIAGNOSIS — O43.193 PLACENTA SUCCENTURIATA IN THIRD TRIMESTER: ICD-10-CM

## 2022-08-22 DIAGNOSIS — Z34.93 PRENATAL CARE, THIRD TRIMESTER: Primary | ICD-10-CM

## 2022-08-22 PROCEDURE — 99207 PR PRENATAL VISIT: CPT | Performed by: OBSTETRICS & GYNECOLOGY

## 2022-08-22 PROCEDURE — 76819 FETAL BIOPHYS PROFIL W/O NST: CPT

## 2022-08-22 RX ORDER — SODIUM CHLORIDE, SODIUM LACTATE, POTASSIUM CHLORIDE, CALCIUM CHLORIDE 600; 310; 30; 20 MG/100ML; MG/100ML; MG/100ML; MG/100ML
INJECTION, SOLUTION INTRAVENOUS CONTINUOUS
Status: CANCELLED | OUTPATIENT
Start: 2022-08-22

## 2022-08-22 RX ORDER — ACETAMINOPHEN 325 MG/1
975 TABLET ORAL ONCE
Status: CANCELLED | OUTPATIENT
Start: 2022-08-22 | End: 2022-08-22

## 2022-08-22 RX ORDER — MISOPROSTOL 200 UG/1
400 TABLET ORAL
Status: CANCELLED | OUTPATIENT
Start: 2022-08-22

## 2022-08-22 RX ORDER — OXYTOCIN 10 [USP'U]/ML
10 INJECTION, SOLUTION INTRAMUSCULAR; INTRAVENOUS
Status: CANCELLED | OUTPATIENT
Start: 2022-08-22

## 2022-08-22 RX ORDER — TRANEXAMIC ACID 10 MG/ML
1 INJECTION, SOLUTION INTRAVENOUS EVERY 30 MIN PRN
Status: CANCELLED | OUTPATIENT
Start: 2022-08-22

## 2022-08-22 RX ORDER — FERROUS SULFATE 325(65) MG
TABLET ORAL
COMMUNITY
End: 2024-04-24

## 2022-08-22 RX ORDER — MULTIVITAMIN WITH IRON
1 TABLET ORAL DAILY
COMMUNITY
End: 2024-04-24

## 2022-08-22 RX ORDER — CITRIC ACID/SODIUM CITRATE 334-500MG
30 SOLUTION, ORAL ORAL
Status: CANCELLED | OUTPATIENT
Start: 2022-08-22

## 2022-08-22 RX ORDER — CLINDAMYCIN PHOSPHATE 900 MG/50ML
900 INJECTION, SOLUTION INTRAVENOUS
Status: CANCELLED | OUTPATIENT
Start: 2022-08-22

## 2022-08-22 RX ORDER — OXYTOCIN/0.9 % SODIUM CHLORIDE 30/500 ML
340 PLASTIC BAG, INJECTION (ML) INTRAVENOUS CONTINUOUS PRN
Status: CANCELLED | OUTPATIENT
Start: 2022-08-22

## 2022-08-22 RX ORDER — LIDOCAINE 40 MG/G
CREAM TOPICAL
Status: CANCELLED | OUTPATIENT
Start: 2022-08-22

## 2022-08-22 RX ORDER — CARBOPROST TROMETHAMINE 250 UG/ML
250 INJECTION, SOLUTION INTRAMUSCULAR
Status: CANCELLED | OUTPATIENT
Start: 2022-08-22

## 2022-08-22 RX ORDER — MISOPROSTOL 200 UG/1
800 TABLET ORAL
Status: CANCELLED | OUTPATIENT
Start: 2022-08-22

## 2022-08-22 RX ORDER — METHYLERGONOVINE MALEATE 0.2 MG/ML
200 INJECTION INTRAVENOUS
Status: CANCELLED | OUTPATIENT
Start: 2022-08-22

## 2022-08-22 RX ORDER — OXYTOCIN/0.9 % SODIUM CHLORIDE 30/500 ML
100-340 PLASTIC BAG, INJECTION (ML) INTRAVENOUS CONTINUOUS PRN
Status: CANCELLED | OUTPATIENT
Start: 2022-08-22

## 2022-08-22 NOTE — PROGRESS NOTES
"Initial /87 (BP Location: Left arm, Patient Position: Chair, Cuff Size: Adult Regular)   Pulse 76   Temp 98.2  F (36.8  C) (Tympanic)   Resp 16   Ht 1.575 m (5' 2\")   Wt 72.1 kg (159 lb)   LMP 11/22/2021   BMI 29.08 kg/m   Estimated body mass index is 29.08 kg/m  as calculated from the following:    Height as of this encounter: 1.575 m (5' 2\").    Weight as of this encounter: 72.1 kg (159 lb). .      "

## 2022-08-22 NOTE — PROGRESS NOTES
"[facility]     Return OB Note     CC: Return OB      Subjective:  Aidee is a 26 year old  at 37w4d    Denies vaginal bleeding, loss of fluid, or regular contractions. Good fetal movement.  Complaints today: None     Objective:  /87 (BP Location: Left arm, Patient Position: Chair, Cuff Size: Adult Regular)   Pulse 76   Temp 98.2  F (36.8  C) (Tympanic)   Resp 16   Ht 1.575 m (5' 2\")   Wt 72.1 kg (159 lb)   LMP 2021   BMI 29.08 kg/m        Fundal height: 36  FHT: 140s  SVE Ex os 1/in closed/50/-2      Assessment/Plan:     IUP at 37w4d   -Mid trimester labs  -Abnormal placenta: Per MFM: \"The main bulk of the placenta is anterior-fundal with a posterior succenturiate lobe. The placental cord insertion is velamentous and between the two placental masses.\"  On ultrasound today, patient continue to be breech. SDP WNL but total JOHN borderline at 7.1. BPP . Recommended fluid recheck later this week. Discussed that if SDP drops may consider delivery sooner. Otherwise planning on delivery at 39w. After discussion last week, not interested in ECV, would plan on primary CS, unless flips to vertex spontaneously. Will schedule CS for 39w today with plan for twice weekly BPPs until that time to watch fluid and given cord insert.  -Growth US 7/15 EFW 21%. Watch fundal heights and growth US as S<D  -S/p TDap  -Strict return precautions given      Malena Tolliver MD   2022 12:38 PM   "

## 2022-08-22 NOTE — LETTER
August 23, 2022      Aidee Pope  [address] [facility] [place] [address]        To Whom It May Concern:    Aidee Pope  was seen on     Sincerely,        Malena Tolliver MD

## 2022-08-22 NOTE — LETTER
August 23, 2022      Aidee Pope  [address] [address] [facility] [place] [address]        To Whom It May Concern:    Aidee Pope  was seen on ***.  Please excuse her  until *** due to {WORK EXCUSE:256494}.        Sincerely,        Malena Tolliver MD

## 2022-08-22 NOTE — TELEPHONE ENCOUNTER
Called and informed pt of procedure time and date. Pt agreeable. Pt has an appointment on 8/29/22 in clinic and will get her paperwork and soap and also will schedule a covid test.    Karla Shane   Clinic Station [place]   [facility]  [phone]

## 2022-08-22 NOTE — TELEPHONE ENCOUNTER
"[id number]  Aidee Pope    You are now scheduled for surgery at [facility].  Below are the details for your surgery.  Please read the \"Preparing for Your Surgery\" instructions and let us know if you have any questions.    Type of surgery:  SECTION  Surgeon:  Malena Tolliver MD  Location of surgery: [facility] OR    Date of surgery:  22    Time:  9:00 am   Arrival Time: 7:30 am    Time can change, to be confirmed a couple of days prior by pre-op surgery nurse.    Pre-Op Appt Date: Patient to schedule with a PCP or Family Practice Provider within 30 days to the surgery.  Post-Op Appt Date: To be determined by provider     *For overnight Surgery - PCR Covid-19 test from a lab required 2-4 days prior.  See \"testing for Covid-19 handout\"    *For Same Day Surgery Covid-19 test required 1-2 days prior.  See \"testing for Covid-19 handout\"    Packet sent out: Yes  Pre-cert/Authorization completed:  TBD by Financial Securing Office.   MA Sterilization/Hysterectomy Acknowledgment Consent signed: Yes    [facility]  [phone]    Fax: [phone]  Same Day Surgery [phone]  Fax: [phone]  [facility] [phone]    "

## 2022-08-23 NOTE — TELEPHONE ENCOUNTER
Ok to generate letter for this but I will not be back in clinic until thur. If she needs it sooner, may need to check in with one of the other MDs.     Malena Tolliver MD     Message text

## 2022-08-23 NOTE — TELEPHONE ENCOUNTER
Letter generated for pt. Sent Edictive message to see if she will be picking up or if she would like it faxed.      Karla Shane   Clinic Station    [facility]  [phone]

## 2022-08-25 NOTE — TELEPHONE ENCOUNTER
Letter is completed and signed. My chart message sent to pt. Pt would like to  in clinic.      Karla Shane   Clinic Station    [facility]  [phone]

## 2022-08-26 ENCOUNTER — HOSPITAL ENCOUNTER (OUTPATIENT)
Dept: ULTRASOUND IMAGING | Facility: CLINIC | Age: 27
Discharge: HOME | End: 2022-08-26
Attending: OBSTETRICS & GYNECOLOGY | Admitting: OBSTETRICS & GYNECOLOGY
Payer: COMMERCIAL

## 2022-08-26 DIAGNOSIS — O43.193 PLACENTA SUCCENTURIATA IN THIRD TRIMESTER: ICD-10-CM

## 2022-08-26 DIAGNOSIS — O43.123 VELAMENTOUS INSERTION OF UMBILICAL CORD IN THIRD TRIMESTER: ICD-10-CM

## 2022-08-26 PROCEDURE — 76819 FETAL BIOPHYS PROFIL W/O NST: CPT

## 2022-08-29 ENCOUNTER — PRENATAL OFFICE VISIT (OUTPATIENT)
Dept: OBGYN | Facility: CLINIC | Age: 27
End: 2022-08-29
Payer: COMMERCIAL

## 2022-08-29 ENCOUNTER — LAB (OUTPATIENT)
Dept: LAB | Facility: CLINIC | Age: 27
End: 2022-08-29

## 2022-08-29 ENCOUNTER — HOSPITAL ENCOUNTER (OUTPATIENT)
Dept: ULTRASOUND IMAGING | Facility: CLINIC | Age: 27
Discharge: HOME | End: 2022-08-29
Attending: OBSTETRICS & GYNECOLOGY | Admitting: OBSTETRICS & GYNECOLOGY
Payer: COMMERCIAL

## 2022-08-29 VITALS
TEMPERATURE: 97.3 F | HEART RATE: 88 BPM | DIASTOLIC BLOOD PRESSURE: 84 MMHG | WEIGHT: 158.6 LBS | BODY MASS INDEX: 29.19 KG/M2 | RESPIRATION RATE: 16 BRPM | SYSTOLIC BLOOD PRESSURE: 129 MMHG | HEIGHT: 62 IN

## 2022-08-29 DIAGNOSIS — O43.123 VELAMENTOUS INSERTION OF UMBILICAL CORD IN THIRD TRIMESTER: ICD-10-CM

## 2022-08-29 DIAGNOSIS — O43.193 PLACENTA SUCCENTURIATA IN THIRD TRIMESTER: ICD-10-CM

## 2022-08-29 DIAGNOSIS — Z20.822 ENCOUNTER FOR LABORATORY TESTING FOR COVID-19 VIRUS: ICD-10-CM

## 2022-08-29 DIAGNOSIS — Z34.03 ENCOUNTER FOR PRENATAL CARE IN THIRD TRIMESTER OF FIRST PREGNANCY: Primary | ICD-10-CM

## 2022-08-29 LAB — SARS-COV-2 RNA RESP QL NAA+PROBE: NEGATIVE

## 2022-08-29 PROCEDURE — U0003 INFECTIOUS AGENT DETECTION BY NUCLEIC ACID (DNA OR RNA); SEVERE ACUTE RESPIRATORY SYNDROME CORONAVIRUS 2 (SARS-COV-2) (CORONAVIRUS DISEASE [COVID-19]), AMPLIFIED PROBE TECHNIQUE, MAKING USE OF HIGH THROUGHPUT TECHNOLOGIES AS DESCRIBED BY CMS-2020-01-R: HCPCS

## 2022-08-29 PROCEDURE — U0005 INFEC AGEN DETEC AMPLI PROBE: HCPCS

## 2022-08-29 PROCEDURE — 76819 FETAL BIOPHYS PROFIL W/O NST: CPT

## 2022-08-29 PROCEDURE — 99207 PR PRENATAL VISIT: CPT | Performed by: OBSTETRICS & GYNECOLOGY

## 2022-08-29 NOTE — PROGRESS NOTES
"Initial /84 (BP Location: Left arm, Patient Position: Chair, Cuff Size: Adult Regular)   Pulse 88   Temp 97.3  F (36.3  C) (Tympanic)   Resp 16   Ht 1.575 m (5' 2\")   Wt 71.9 kg (158 lb 9.6 oz)   LMP 11/22/2021   BMI 29.01 kg/m   Estimated body mass index is 29.01 kg/m  as calculated from the following:    Height as of this encounter: 1.575 m (5' 2\").    Weight as of this encounter: 71.9 kg (158 lb 9.6 oz). .      "

## 2022-08-29 NOTE — PROGRESS NOTES
"[facility]    Return OB Note    CC: Return OB     Subjective:  Aidee is a 26 year old  at 38w4d   Denies vaginal bleeding, loss of fluid, or regular contractions. Good fetal movement.  Complaints today: None    Objective:  /84 (BP Location: Left arm, Patient Position: Chair, Cuff Size: Adult Regular)   Pulse 88   Temp 97.3  F (36.3  C) (Tympanic)   Resp 16   Ht 1.575 m (5' 2\")   Wt 71.9 kg (158 lb 9.6 oz)   LMP 2021   BMI 29.01 kg/m      Fundal height: 35cm  FHT: 125bpm    Assessment/Plan:   Encounter Diagnoses   Name Primary?     Encounter for prenatal care in third trimester of first pregnancy Yes     Placenta succenturiata in third trimester      Velamentous insertion of umbilical cord in third trimester        IUP at 38w4d  -Breech presentation: has been counseled on ECV vs. C section and decided to move forward with C section, scheduled for   -Succenturiate placenta with velamentous cord insertion  -GBS negative  -Strict return precautions given    RTC post partum    Yaneli Chavez DO    "

## 2022-08-31 ENCOUNTER — TELEPHONE (OUTPATIENT)
Dept: OBGYN | Facility: CLINIC | Age: 27
End: 2022-08-31

## 2022-08-31 ENCOUNTER — ANESTHESIA EVENT (OUTPATIENT)
Dept: SURGERY | Facility: CLINIC | Age: 27
End: 2022-08-31
Payer: COMMERCIAL

## 2022-08-31 NOTE — ANESTHESIA PREPROCEDURE EVALUATION
Anesthesia Pre-Procedure Evaluation    Patient: Aidee Pope   MRN: [id number] : 1995        Procedure : Procedure(s):   SECTION          Past Medical History:   Diagnosis Date     Chickenpox       Past Surgical History:   Procedure Laterality Date     WRIST FRACTURE SURGERY Right       Allergies   Allergen Reactions     Amoxicillin Rash     Bee Venom      Wasp Venom Protein      Other reaction(s): Angioedema      Social History     Tobacco Use     Smoking status: Never Smoker     Smokeless tobacco: Never Used   Substance Use Topics     Alcohol use: Not Currently     Comment: occas-quit with pregnancy      Wt Readings from Last 1 Encounters:   22 71.9 kg (158 lb 9.6 oz)        Anesthesia Evaluation   Pt has had prior anesthetic. Type: General.    No history of anesthetic complications       ROS/MED HX  ENT/Pulmonary:  - neg pulmonary ROS     Neurologic:  - neg neurologic ROS     Cardiovascular:       METS/Exercise Tolerance:     Hematologic:       Musculoskeletal:       GI/Hepatic:       Renal/Genitourinary:       Endo:  - neg endo ROS     Psychiatric/Substance Use:     (+) psychiatric history anxiety and depression     Infectious Disease:       Malignancy:       Other:            Physical Exam    Airway        Mallampati: II   TM distance: > 3 FB   Neck ROM: full   Mouth opening: > 3 cm    Respiratory Devices and Support         Dental  no notable dental history         Cardiovascular   cardiovascular exam normal          Pulmonary   pulmonary exam normal                OUTSIDE LABS:  CBC:   Lab Results   Component Value Date    WBC 7.7 2022    WBC 6.3 2022    HGB 11.5 (L) 2022    HGB 12.8 2022    HCT 34.4 (L) 2022    HCT 38.7 2022     2022     2022     BMP:   Lab Results   Component Value Date     2017     12/10/2016    POTASSIUM 3.9 2017    POTASSIUM 3.4 12/10/2016    CHLORIDE 103 2017    CHLORIDE  102 12/10/2016    CO2 28 12/14/2017    CO2 23 12/10/2016    BUN 12 12/14/2017    BUN 8 12/10/2016    CR 0.62 12/14/2017    CR 0.70 12/10/2016    GLC 95 12/14/2017    GLC 87 12/10/2016     COAGS: No results found for: PTT, INR, FIBR  POC:   Lab Results   Component Value Date    HCG Negative 04/08/2018     HEPATIC:   Lab Results   Component Value Date    ALBUMIN 3.8 12/14/2017    PROTTOTAL 7.7 12/14/2017    ALT 23 12/14/2017    AST 24 12/14/2017    ALKPHOS 40 12/14/2017    BILITOTAL 0.6 12/14/2017     OTHER:   Lab Results   Component Value Date    GAURI 8.4 (L) 12/14/2017    LIPASE 102 12/10/2016    TSH 2.70 12/10/2016       Anesthesia Plan    ASA Status:  2      Anesthesia Type: Spinal.              Consents    Anesthesia Plan(s) and associated risks, benefits, and realistic alternatives discussed. Questions answered and patient/representative(s) expressed understanding.     - Discussed: Risks, Benefits and Alternatives for BOTH SEDATION and the PROCEDURE were discussed     - Discussed with:  Patient         Postoperative Care            Comments:           H&P reviewed: Unable to attach H&P to encounter due to EHR limitations. H&P Update: appropriate H&P reviewed, patient examined. No interval changes since H&P (within 30 days).    neg OB ROS.       Blanca Quijano APRN CRNA

## 2022-09-01 ENCOUNTER — MEDICAL CORRESPONDENCE (OUTPATIENT)
Dept: OBGYN | Facility: CLINIC | Age: 27
End: 2022-09-01

## 2022-09-01 ENCOUNTER — ANESTHESIA (OUTPATIENT)
Dept: SURGERY | Facility: CLINIC | Age: 27
End: 2022-09-01
Payer: COMMERCIAL

## 2022-09-01 ENCOUNTER — HOSPITAL ENCOUNTER (INPATIENT)
Facility: CLINIC | Age: 27
LOS: 3 days | Discharge: HOME | End: 2022-09-04
Attending: OBSTETRICS & GYNECOLOGY | Admitting: OBSTETRICS & GYNECOLOGY
Payer: COMMERCIAL

## 2022-09-01 DIAGNOSIS — Z98.891 S/P CESAREAN SECTION: Primary | ICD-10-CM

## 2022-09-01 LAB
ABO/RH(D): NORMAL
ANTIBODY SCREEN: NEGATIVE
HGB BLD-MCNC: 11.8 G/DL (ref 11.7–15.7)
SPECIMEN EXPIRATION DATE: NORMAL

## 2022-09-01 PROCEDURE — 250N000013 HC RX MED GY IP 250 OP 250 PS 637: Performed by: OBSTETRICS & GYNECOLOGY

## 2022-09-01 PROCEDURE — 710N000009 HC RECOVERY PHASE 1, LEVEL 1, PER MIN: Performed by: OBSTETRICS & GYNECOLOGY

## 2022-09-01 PROCEDURE — 86780 TREPONEMA PALLIDUM: CPT | Performed by: OBSTETRICS & GYNECOLOGY

## 2022-09-01 PROCEDURE — 250N000009 HC RX 250: Performed by: NURSE ANESTHETIST, CERTIFIED REGISTERED

## 2022-09-01 PROCEDURE — 120N000001 HC R&B MED SURG/OB

## 2022-09-01 PROCEDURE — 86901 BLOOD TYPING SEROLOGIC RH(D): CPT | Performed by: OBSTETRICS & GYNECOLOGY

## 2022-09-01 PROCEDURE — 360N000076 HC SURGERY LEVEL 3, PER MIN: Performed by: OBSTETRICS & GYNECOLOGY

## 2022-09-01 PROCEDURE — 272N000001 HC OR GENERAL SUPPLY STERILE: Performed by: OBSTETRICS & GYNECOLOGY

## 2022-09-01 PROCEDURE — 250N000011 HC RX IP 250 OP 636: Performed by: NURSE ANESTHETIST, CERTIFIED REGISTERED

## 2022-09-01 PROCEDURE — 370N000017 HC ANESTHESIA TECHNICAL FEE, PER MIN: Performed by: OBSTETRICS & GYNECOLOGY

## 2022-09-01 PROCEDURE — 85018 HEMOGLOBIN: CPT | Performed by: OBSTETRICS & GYNECOLOGY

## 2022-09-01 PROCEDURE — 250N000009 HC RX 250

## 2022-09-01 PROCEDURE — 250N000011 HC RX IP 250 OP 636: Performed by: OBSTETRICS & GYNECOLOGY

## 2022-09-01 PROCEDURE — 59510 CESAREAN DELIVERY: CPT | Performed by: OBSTETRICS & GYNECOLOGY

## 2022-09-01 PROCEDURE — 250N000009 HC RX 250: Performed by: OBSTETRICS & GYNECOLOGY

## 2022-09-01 PROCEDURE — 271N000001 HC OR GENERAL SUPPLY NON-STERILE: Performed by: OBSTETRICS & GYNECOLOGY

## 2022-09-01 PROCEDURE — 258N000003 HC RX IP 258 OP 636: Performed by: OBSTETRICS & GYNECOLOGY

## 2022-09-01 PROCEDURE — 86850 RBC ANTIBODY SCREEN: CPT | Performed by: OBSTETRICS & GYNECOLOGY

## 2022-09-01 RX ORDER — MISOPROSTOL 200 UG/1
800 TABLET ORAL
Status: DISCONTINUED | OUTPATIENT
Start: 2022-09-01 | End: 2022-09-01 | Stop reason: HOSPADM

## 2022-09-01 RX ORDER — LIDOCAINE HYDROCHLORIDE 10 MG/ML
INJECTION, SOLUTION EPIDURAL; INFILTRATION; INTRACAUDAL; PERINEURAL PRN
Status: DISCONTINUED | OUTPATIENT
Start: 2022-09-01 | End: 2022-09-01

## 2022-09-01 RX ORDER — OXYCODONE HYDROCHLORIDE 5 MG/1
5 TABLET ORAL EVERY 4 HOURS PRN
Status: DISCONTINUED | OUTPATIENT
Start: 2022-09-01 | End: 2022-09-03

## 2022-09-01 RX ORDER — SODIUM CHLORIDE, SODIUM LACTATE, POTASSIUM CHLORIDE, CALCIUM CHLORIDE 600; 310; 30; 20 MG/100ML; MG/100ML; MG/100ML; MG/100ML
INJECTION, SOLUTION INTRAVENOUS CONTINUOUS
Status: DISCONTINUED | OUTPATIENT
Start: 2022-09-01 | End: 2022-09-03 | Stop reason: ALTCHOICE

## 2022-09-01 RX ORDER — MODIFIED LANOLIN
OINTMENT (GRAM) TOPICAL
Status: DISCONTINUED | OUTPATIENT
Start: 2022-09-01 | End: 2022-09-04 | Stop reason: HOSPADM

## 2022-09-01 RX ORDER — MORPHINE SULFATE 1 MG/ML
INJECTION, SOLUTION EPIDURAL; INTRATHECAL; INTRAVENOUS PRN
Status: DISCONTINUED | OUTPATIENT
Start: 2022-09-01 | End: 2022-09-01

## 2022-09-01 RX ORDER — METHYLERGONOVINE MALEATE 0.2 MG/ML
200 INJECTION INTRAVENOUS
Status: DISCONTINUED | OUTPATIENT
Start: 2022-09-01 | End: 2022-09-01 | Stop reason: HOSPADM

## 2022-09-01 RX ORDER — ONDANSETRON 2 MG/ML
INJECTION INTRAMUSCULAR; INTRAVENOUS PRN
Status: DISCONTINUED | OUTPATIENT
Start: 2022-09-01 | End: 2022-09-01

## 2022-09-01 RX ORDER — PROCHLORPERAZINE 25 MG
25 SUPPOSITORY, RECTAL RECTAL EVERY 12 HOURS PRN
Status: DISCONTINUED | OUTPATIENT
Start: 2022-09-01 | End: 2022-09-04 | Stop reason: HOSPADM

## 2022-09-01 RX ORDER — DIPHENHYDRAMINE HYDROCHLORIDE 50 MG/ML
25 INJECTION INTRAMUSCULAR; INTRAVENOUS EVERY 6 HOURS PRN
Status: DISCONTINUED | OUTPATIENT
Start: 2022-09-01 | End: 2022-09-04 | Stop reason: HOSPADM

## 2022-09-01 RX ORDER — PROCHLORPERAZINE MALEATE 10 MG
10 TABLET ORAL EVERY 6 HOURS PRN
Status: DISCONTINUED | OUTPATIENT
Start: 2022-09-01 | End: 2022-09-04 | Stop reason: HOSPADM

## 2022-09-01 RX ORDER — OXYTOCIN/0.9 % SODIUM CHLORIDE 30/500 ML
100-340 PLASTIC BAG, INJECTION (ML) INTRAVENOUS CONTINUOUS PRN
Status: DISCONTINUED | OUTPATIENT
Start: 2022-09-01 | End: 2022-09-02

## 2022-09-01 RX ORDER — ONDANSETRON 2 MG/ML
4 INJECTION INTRAMUSCULAR; INTRAVENOUS EVERY 6 HOURS PRN
Status: DISCONTINUED | OUTPATIENT
Start: 2022-09-01 | End: 2022-09-04 | Stop reason: HOSPADM

## 2022-09-01 RX ORDER — HYDROCORTISONE 25 MG/G
CREAM TOPICAL 3 TIMES DAILY PRN
Status: DISCONTINUED | OUTPATIENT
Start: 2022-09-01 | End: 2022-09-04 | Stop reason: HOSPADM

## 2022-09-01 RX ORDER — SODIUM CHLORIDE, SODIUM LACTATE, POTASSIUM CHLORIDE, CALCIUM CHLORIDE 600; 310; 30; 20 MG/100ML; MG/100ML; MG/100ML; MG/100ML
INJECTION, SOLUTION INTRAVENOUS CONTINUOUS
Status: DISCONTINUED | OUTPATIENT
Start: 2022-09-01 | End: 2022-09-01 | Stop reason: HOSPADM

## 2022-09-01 RX ORDER — KETOROLAC TROMETHAMINE 30 MG/ML
INJECTION, SOLUTION INTRAMUSCULAR; INTRAVENOUS PRN
Status: DISCONTINUED | OUTPATIENT
Start: 2022-09-01 | End: 2022-09-01

## 2022-09-01 RX ORDER — MISOPROSTOL 200 UG/1
400 TABLET ORAL
Status: DISCONTINUED | OUTPATIENT
Start: 2022-09-01 | End: 2022-09-04 | Stop reason: HOSPADM

## 2022-09-01 RX ORDER — MISOPROSTOL 200 UG/1
800 TABLET ORAL
Status: DISCONTINUED | OUTPATIENT
Start: 2022-09-01 | End: 2022-09-04 | Stop reason: HOSPADM

## 2022-09-01 RX ORDER — CLINDAMYCIN PHOSPHATE 900 MG/50ML
900 INJECTION, SOLUTION INTRAVENOUS
Status: COMPLETED | OUTPATIENT
Start: 2022-09-01 | End: 2022-09-01

## 2022-09-01 RX ORDER — ONDANSETRON 4 MG/1
4 TABLET, ORALLY DISINTEGRATING ORAL EVERY 6 HOURS PRN
Status: DISCONTINUED | OUTPATIENT
Start: 2022-09-01 | End: 2022-09-04 | Stop reason: HOSPADM

## 2022-09-01 RX ORDER — METHYLERGONOVINE MALEATE 0.2 MG/ML
200 INJECTION INTRAVENOUS
Status: DISCONTINUED | OUTPATIENT
Start: 2022-09-01 | End: 2022-09-04 | Stop reason: HOSPADM

## 2022-09-01 RX ORDER — OXYTOCIN 10 [USP'U]/ML
10 INJECTION, SOLUTION INTRAMUSCULAR; INTRAVENOUS
Status: DISCONTINUED | OUTPATIENT
Start: 2022-09-01 | End: 2022-09-02

## 2022-09-01 RX ORDER — DIPHENHYDRAMINE HCL 25 MG
25-50 CAPSULE ORAL EVERY 6 HOURS PRN
Status: DISCONTINUED | OUTPATIENT
Start: 2022-09-01 | End: 2022-09-04 | Stop reason: HOSPADM

## 2022-09-01 RX ORDER — LIDOCAINE HYDROCHLORIDE 10 MG/ML
INJECTION, SOLUTION EPIDURAL; INFILTRATION; INTRACAUDAL; PERINEURAL
Status: COMPLETED
Start: 2022-09-01 | End: 2022-09-01

## 2022-09-01 RX ORDER — ACETAMINOPHEN 325 MG/1
975 TABLET ORAL ONCE
Status: COMPLETED | OUTPATIENT
Start: 2022-09-01 | End: 2022-09-01

## 2022-09-01 RX ORDER — BUPIVACAINE HYDROCHLORIDE 7.5 MG/ML
INJECTION, SOLUTION INTRASPINAL PRN
Status: DISCONTINUED | OUTPATIENT
Start: 2022-09-01 | End: 2022-09-01

## 2022-09-01 RX ORDER — METOCLOPRAMIDE 10 MG/1
10 TABLET ORAL EVERY 6 HOURS PRN
Status: DISCONTINUED | OUTPATIENT
Start: 2022-09-01 | End: 2022-09-04 | Stop reason: HOSPADM

## 2022-09-01 RX ORDER — ONDANSETRON 4 MG/1
4 TABLET, ORALLY DISINTEGRATING ORAL EVERY 30 MIN PRN
Status: DISCONTINUED | OUTPATIENT
Start: 2022-09-01 | End: 2022-09-03

## 2022-09-01 RX ORDER — AMOXICILLIN 250 MG
1 CAPSULE ORAL 2 TIMES DAILY
Status: DISCONTINUED | OUTPATIENT
Start: 2022-09-01 | End: 2022-09-04 | Stop reason: HOSPADM

## 2022-09-01 RX ORDER — LIDOCAINE 40 MG/G
CREAM TOPICAL
Status: DISCONTINUED | OUTPATIENT
Start: 2022-09-01 | End: 2022-09-01 | Stop reason: HOSPADM

## 2022-09-01 RX ORDER — ACETAMINOPHEN 325 MG/1
975 TABLET ORAL EVERY 6 HOURS
Status: DISCONTINUED | OUTPATIENT
Start: 2022-09-01 | End: 2022-09-04 | Stop reason: HOSPADM

## 2022-09-01 RX ORDER — NALOXONE HYDROCHLORIDE 0.4 MG/ML
0.4 INJECTION, SOLUTION INTRAMUSCULAR; INTRAVENOUS; SUBCUTANEOUS
Status: DISCONTINUED | OUTPATIENT
Start: 2022-09-01 | End: 2022-09-04 | Stop reason: HOSPADM

## 2022-09-01 RX ORDER — FENTANYL CITRATE 50 UG/ML
25 INJECTION, SOLUTION INTRAMUSCULAR; INTRAVENOUS EVERY 5 MIN PRN
Status: DISCONTINUED | OUTPATIENT
Start: 2022-09-01 | End: 2022-09-04 | Stop reason: HOSPADM

## 2022-09-01 RX ORDER — LIDOCAINE HYDROCHLORIDE 10 MG/ML
1 INJECTION, SOLUTION EPIDURAL; INFILTRATION; INTRACAUDAL; PERINEURAL ONCE
Status: COMPLETED | OUTPATIENT
Start: 2022-09-01 | End: 2022-09-01

## 2022-09-01 RX ORDER — HYDROMORPHONE HCL IN WATER/PF 6 MG/30 ML
0.2 PATIENT CONTROLLED ANALGESIA SYRINGE INTRAVENOUS EVERY 5 MIN PRN
Status: DISCONTINUED | OUTPATIENT
Start: 2022-09-01 | End: 2022-09-04 | Stop reason: HOSPADM

## 2022-09-01 RX ORDER — CITRIC ACID/SODIUM CITRATE 334-500MG
30 SOLUTION, ORAL ORAL
Status: COMPLETED | OUTPATIENT
Start: 2022-09-01 | End: 2022-09-01

## 2022-09-01 RX ORDER — BISACODYL 10 MG
10 SUPPOSITORY, RECTAL RECTAL DAILY PRN
Status: DISCONTINUED | OUTPATIENT
Start: 2022-09-03 | End: 2022-09-04 | Stop reason: HOSPADM

## 2022-09-01 RX ORDER — OXYTOCIN/0.9 % SODIUM CHLORIDE 30/500 ML
340 PLASTIC BAG, INJECTION (ML) INTRAVENOUS CONTINUOUS PRN
Status: DISCONTINUED | OUTPATIENT
Start: 2022-09-01 | End: 2022-09-01 | Stop reason: HOSPADM

## 2022-09-01 RX ORDER — SIMETHICONE 80 MG
80 TABLET,CHEWABLE ORAL 4 TIMES DAILY PRN
Status: DISCONTINUED | OUTPATIENT
Start: 2022-09-01 | End: 2022-09-04 | Stop reason: HOSPADM

## 2022-09-01 RX ORDER — DEXTROSE, SODIUM CHLORIDE, SODIUM LACTATE, POTASSIUM CHLORIDE, AND CALCIUM CHLORIDE 5; .6; .31; .03; .02 G/100ML; G/100ML; G/100ML; G/100ML; G/100ML
INJECTION, SOLUTION INTRAVENOUS CONTINUOUS
Status: DISCONTINUED | OUTPATIENT
Start: 2022-09-01 | End: 2022-09-04 | Stop reason: HOSPADM

## 2022-09-01 RX ORDER — TRANEXAMIC ACID 10 MG/ML
1 INJECTION, SOLUTION INTRAVENOUS EVERY 30 MIN PRN
Status: DISCONTINUED | OUTPATIENT
Start: 2022-09-01 | End: 2022-09-01 | Stop reason: HOSPADM

## 2022-09-01 RX ORDER — PHENYLEPHRINE HYDROCHLORIDE 10 MG/ML
INJECTION INTRAVENOUS PRN
Status: DISCONTINUED | OUTPATIENT
Start: 2022-09-01 | End: 2022-09-01

## 2022-09-01 RX ORDER — OXYTOCIN/0.9 % SODIUM CHLORIDE 30/500 ML
340 PLASTIC BAG, INJECTION (ML) INTRAVENOUS CONTINUOUS PRN
Status: DISCONTINUED | OUTPATIENT
Start: 2022-09-01 | End: 2022-09-04 | Stop reason: HOSPADM

## 2022-09-01 RX ORDER — LIDOCAINE 40 MG/G
CREAM TOPICAL
Status: DISCONTINUED | OUTPATIENT
Start: 2022-09-01 | End: 2022-09-04 | Stop reason: HOSPADM

## 2022-09-01 RX ORDER — TRANEXAMIC ACID 10 MG/ML
1 INJECTION, SOLUTION INTRAVENOUS EVERY 30 MIN PRN
Status: DISCONTINUED | OUTPATIENT
Start: 2022-09-01 | End: 2022-09-04 | Stop reason: HOSPADM

## 2022-09-01 RX ORDER — DEXAMETHASONE SODIUM PHOSPHATE 4 MG/ML
INJECTION, SOLUTION INTRA-ARTICULAR; INTRALESIONAL; INTRAMUSCULAR; INTRAVENOUS; SOFT TISSUE PRN
Status: DISCONTINUED | OUTPATIENT
Start: 2022-09-01 | End: 2022-09-01

## 2022-09-01 RX ORDER — NALOXONE HYDROCHLORIDE 0.4 MG/ML
0.2 INJECTION, SOLUTION INTRAMUSCULAR; INTRAVENOUS; SUBCUTANEOUS
Status: DISCONTINUED | OUTPATIENT
Start: 2022-09-01 | End: 2022-09-04 | Stop reason: HOSPADM

## 2022-09-01 RX ORDER — CARBOPROST TROMETHAMINE 250 UG/ML
250 INJECTION, SOLUTION INTRAMUSCULAR
Status: DISCONTINUED | OUTPATIENT
Start: 2022-09-01 | End: 2022-09-01 | Stop reason: HOSPADM

## 2022-09-01 RX ORDER — METOCLOPRAMIDE HYDROCHLORIDE 5 MG/ML
10 INJECTION INTRAMUSCULAR; INTRAVENOUS EVERY 6 HOURS PRN
Status: DISCONTINUED | OUTPATIENT
Start: 2022-09-01 | End: 2022-09-04 | Stop reason: HOSPADM

## 2022-09-01 RX ORDER — OXYTOCIN 10 [USP'U]/ML
10 INJECTION, SOLUTION INTRAMUSCULAR; INTRAVENOUS
Status: DISCONTINUED | OUTPATIENT
Start: 2022-09-01 | End: 2022-09-04 | Stop reason: HOSPADM

## 2022-09-01 RX ORDER — ONDANSETRON 2 MG/ML
4 INJECTION INTRAMUSCULAR; INTRAVENOUS EVERY 30 MIN PRN
Status: DISCONTINUED | OUTPATIENT
Start: 2022-09-01 | End: 2022-09-03

## 2022-09-01 RX ORDER — OXYTOCIN 10 [USP'U]/ML
10 INJECTION, SOLUTION INTRAMUSCULAR; INTRAVENOUS
Status: DISCONTINUED | OUTPATIENT
Start: 2022-09-01 | End: 2022-09-01 | Stop reason: HOSPADM

## 2022-09-01 RX ORDER — KETOROLAC TROMETHAMINE 30 MG/ML
30 INJECTION, SOLUTION INTRAMUSCULAR; INTRAVENOUS EVERY 6 HOURS
Status: COMPLETED | OUTPATIENT
Start: 2022-09-01 | End: 2022-09-02

## 2022-09-01 RX ORDER — IBUPROFEN 800 MG/1
800 TABLET, FILM COATED ORAL EVERY 6 HOURS
Status: DISCONTINUED | OUTPATIENT
Start: 2022-09-02 | End: 2022-09-04 | Stop reason: HOSPADM

## 2022-09-01 RX ORDER — MISOPROSTOL 200 UG/1
400 TABLET ORAL
Status: DISCONTINUED | OUTPATIENT
Start: 2022-09-01 | End: 2022-09-01 | Stop reason: HOSPADM

## 2022-09-01 RX ORDER — AMOXICILLIN 250 MG
2 CAPSULE ORAL 2 TIMES DAILY
Status: DISCONTINUED | OUTPATIENT
Start: 2022-09-01 | End: 2022-09-04 | Stop reason: HOSPADM

## 2022-09-01 RX ORDER — CARBOPROST TROMETHAMINE 250 UG/ML
250 INJECTION, SOLUTION INTRAMUSCULAR
Status: DISCONTINUED | OUTPATIENT
Start: 2022-09-01 | End: 2022-09-04 | Stop reason: HOSPADM

## 2022-09-01 RX ADMIN — MORPHINE SULFATE 175 MCG: 1 INJECTION, SOLUTION EPIDURAL; INTRATHECAL; INTRAVENOUS at 09:22

## 2022-09-01 RX ADMIN — KETOROLAC TROMETHAMINE 30 MG: 30 INJECTION, SOLUTION INTRAMUSCULAR; INTRAVENOUS at 21:48

## 2022-09-01 RX ADMIN — OXYCODONE HYDROCHLORIDE 5 MG: 5 TABLET ORAL at 21:48

## 2022-09-01 RX ADMIN — BUPIVACAINE HYDROCHLORIDE IN DEXTROSE 1.5 ML: 7.5 INJECTION, SOLUTION SUBARACHNOID at 09:22

## 2022-09-01 RX ADMIN — DEXAMETHASONE SODIUM PHOSPHATE 4 MG: 4 INJECTION, SOLUTION INTRA-ARTICULAR; INTRALESIONAL; INTRAMUSCULAR; INTRAVENOUS; SOFT TISSUE at 09:31

## 2022-09-01 RX ADMIN — OXYCODONE HYDROCHLORIDE 5 MG: 5 TABLET ORAL at 17:47

## 2022-09-01 RX ADMIN — ACETAMINOPHEN 975 MG: 325 TABLET, FILM COATED ORAL at 14:30

## 2022-09-01 RX ADMIN — PHENYLEPHRINE HYDROCHLORIDE 100 MCG: 10 INJECTION INTRAVENOUS at 09:41

## 2022-09-01 RX ADMIN — ONDANSETRON 8 MG: 2 INJECTION INTRAMUSCULAR; INTRAVENOUS at 09:17

## 2022-09-01 RX ADMIN — KETOROLAC TROMETHAMINE 30 MG: 30 INJECTION, SOLUTION INTRAMUSCULAR at 10:07

## 2022-09-01 RX ADMIN — PHENYLEPHRINE HYDROCHLORIDE 150 MCG: 10 INJECTION INTRAVENOUS at 09:28

## 2022-09-01 RX ADMIN — Medication 5 MCG: at 09:22

## 2022-09-01 RX ADMIN — LIDOCAINE HYDROCHLORIDE 0.2 ML: 10 INJECTION, SOLUTION EPIDURAL; INFILTRATION; INTRACAUDAL; PERINEURAL at 08:38

## 2022-09-01 RX ADMIN — KETOROLAC TROMETHAMINE 30 MG: 30 INJECTION, SOLUTION INTRAMUSCULAR; INTRAVENOUS at 15:41

## 2022-09-01 RX ADMIN — SODIUM CHLORIDE, POTASSIUM CHLORIDE, SODIUM LACTATE AND CALCIUM CHLORIDE: 600; 310; 30; 20 INJECTION, SOLUTION INTRAVENOUS at 09:01

## 2022-09-01 RX ADMIN — ACETAMINOPHEN 975 MG: 325 TABLET, COATED ORAL at 08:39

## 2022-09-01 RX ADMIN — GENTAMICIN SULFATE 140 MG: 40 INJECTION, SOLUTION INTRAMUSCULAR; INTRAVENOUS at 08:42

## 2022-09-01 RX ADMIN — Medication 250 ML/HR: at 09:46

## 2022-09-01 RX ADMIN — Medication 250 ML/HR: at 10:14

## 2022-09-01 RX ADMIN — LIDOCAINE HYDROCHLORIDE 3 ML: 10 INJECTION, SOLUTION EPIDURAL; INFILTRATION; INTRACAUDAL; PERINEURAL at 09:22

## 2022-09-01 RX ADMIN — ACETAMINOPHEN 975 MG: 325 TABLET, FILM COATED ORAL at 20:31

## 2022-09-01 RX ADMIN — CLINDAMYCIN PHOSPHATE 900 MG: 900 INJECTION, SOLUTION INTRAVENOUS at 09:02

## 2022-09-01 RX ADMIN — SODIUM CHLORIDE, POTASSIUM CHLORIDE, SODIUM LACTATE AND CALCIUM CHLORIDE: 600; 310; 30; 20 INJECTION, SOLUTION INTRAVENOUS at 08:37

## 2022-09-01 RX ADMIN — SENNOSIDES AND DOCUSATE SODIUM 1 TABLET: 50; 8.6 TABLET ORAL at 21:49

## 2022-09-01 RX ADMIN — OXYCODONE HYDROCHLORIDE 5 MG: 5 TABLET ORAL at 13:47

## 2022-09-01 RX ADMIN — SODIUM CITRATE AND CITRIC ACID MONOHYDRATE 30 ML: 500; 334 SOLUTION ORAL at 08:39

## 2022-09-01 ASSESSMENT — ACTIVITIES OF DAILY LIVING (ADL)
ADLS_ACUITY_SCORE: 20
ADLS_ACUITY_SCORE: 35
ADLS_ACUITY_SCORE: 20

## 2022-09-01 NOTE — OP NOTE
[facility]  Full Operative Progress Note     Surgery Date:  2022  Surgeon:  Malena Tolliver MD     Pre-op Diagnosis:  1. Intrauterine pregnancy at 39w0d     2. Breech presentation     3. Succenturiate lobe     4. Velamentous cord insert    Post-op Diagnosis:  1. Same      2. Liveborn male infant   Procedure:  Primary low-transverse  section with double layer uterine closure via Pfannenstiel incision    Anesthesia: Spinal   EBL:  44 mL  IVF:  See anesthesia documentation  UOP:  See anesthesia documentation  Drains: Monterroso Catheter   Specimens:  Cord blood/segment  Complications: None   Indications:     Aidee Pope is a 26 year old  at 39w0d admitted for PLTCS in the setting of breech presentation. Reviewed recommendation for this procedure in the setting of declined ECV with velamentous cord insert between two placental lobes.  The risks, benefits, and alternatives of  section were discussed with the patient, and she agreed to proceed.     Findings:   1. Clear amniotic fluid  2. Liveborn male infant in footling breech presentation. Apgars 9 at 1 minute & 9 at 5 minutes. Weight 5 lb 6 oz  3. Placenta with velamentous cord insert noted between main lobe and succenturiate lobe   4. Normal uterus, fallopian tubes, and ovaries.     Procedure Details:   The patient was brought to the OR, where adequate spinal anesthesia was administered.  She was placed in the dorsal supine position with a slight leftward tilt. She was prepped and draped in the usual sterile fashion. A surgical time out was performed. A pfannenstiel skin incision was made with the scalpel, and carried down to the underlying fascia with sharp and blunt dissection. The fascia was incised in the midline, and the incision was extended laterally with the Bryan scissors. The superior aspect of the fascia was grasped with the Kocher clamps and dissected off of the underlying rectus muscles with blunt and sharp  dissection. Attention was then turned to the inferior aspect of the fascia, which was similarly dissected off of the underlying rectus muscles. The rectus muscles were  in the midline, and the peritoneum was entered bluntly, and the opening was extended with digital pressure. Angelo O placed. A transverse hysterotomy was made with the scalpel in the lower uterine segment, and the incision was extended with digital pressure. The infant was noted to be in the footling breech position, and was delivered atraumatically with standard breech maneuvers. No nuchal cord was noted. The cord was doubly clamped and cut immediately given velamentous cord insert, and the infant was handed off to the awaiting RN staff. A segment of cord was cut. The placenta was delivered with gentle traction on the umbilical cord and uterine massage. The uterus was exteriorized and cleared of all clots and debris. Uterine tone was noted to be adequate with pitocin given through the running IV and uterine massage.  The hysterotomy was closed with a running locked suture of 0 Vicryl.  The hysterotomy was then imbricated using an 0 Monocryl suture. The hysterotomy was noted to be hemostatic. The posterior cul-de-sac was cleared of all clots and debris. The uterus was returned to the abdomen. The pericolic gutters were cleared of all clots and debris. The hysterotomy was reexamined and noted to be hemostatic.     The fascia and rectus muscles were examined and areas of oozing were controlled with electrocautery.  The fascia was closed with a running 0 Vicryl suture. The subcutaneous tissue was irrigated and areas of oozing were controlled with electrocautery. The subcutaneous tissue was greater than 2 cm in thickness, and was therefore closed with running 3-0 plain gut. The skin was closed with 4-0 monocryl and skin glue.    All sponge, needle, and instrument counts were correct. The patient tolerated the procedure well, and was transferred  to recovery in stable condition.    Malena Tolliver MD   9/1/2022 12:55 PM

## 2022-09-01 NOTE — ANESTHESIA CARE TRANSFER NOTE
Patient: Aidee Pope    Procedure: Procedure(s):   SECTION       Diagnosis: Breech presentation, single or unspecified fetus [O32.1XX0]  Diagnosis Additional Information: No value filed.    Anesthesia Type:   Spinal     Note:    Oropharynx: oropharynx clear of all foreign objects and spontaneously breathing  Level of Consciousness: awake  Oxygen Supplementation: room air    Independent Airway: airway patency satisfactory and stable  Dentition: dentition unchanged  Vital Signs Stable: post-procedure vital signs reviewed and stable  Report to RN Given: handoff report given  Patient transferred to: Phase II    Handoff Report: Identifed the Patient, Identified the Reponsible Provider, Reviewed the pertinent medical history, Discussed the surgical course, Reviewed Intra-OP anesthesia mangement and issues during anesthesia, Set expectations for post-procedure period and Allowed opportunity for questions and acknowledgement of understanding      Vitals:  Vitals Value Taken Time   BP     Temp     Pulse     Resp     SpO2 98 % 22 1036   Vitals shown include unvalidated device data.    Electronically Signed By: Tanesha Kemp RN  2022  10:36 AM

## 2022-09-01 NOTE — PLAN OF CARE
Fundus is firm with minimal bleeding. Incision is WDL. Vitals and assessments have been WDL. Taking Toradol, Tylenol, and Oxy for pain. Having more pain on the right side of her incision. Ice was applied. Has not gotten up yet due to her legs feeling numb. [place] and attentive towards infant. Is breastfeeding.

## 2022-09-01 NOTE — ANESTHESIA POSTPROCEDURE EVALUATION
Patient: Aidee Pope    Procedure: Procedure(s):   SECTION       Anesthesia Type:  Spinal    Note:  Disposition: Inpatient   Postop Pain Control: Uneventful            Sign Out: Well controlled pain   PONV: No   Neuro/Psych: Uneventful            Sign Out: Acceptable/Baseline neuro status   Airway/Respiratory: Uneventful            Sign Out: Acceptable/Baseline resp. status   CV/Hemodynamics: Uneventful            Sign Out: Acceptable CV status; No obvious hypovolemia; No obvious fluid overload   Other NRE: NONE   DID A NON-ROUTINE EVENT OCCUR? No           Last vitals:  Vitals:    22 0800 22 0839   BP: 130/83    Pulse: 90    Temp: 36.8  C (98.3  F) 36.8  C (98.3  F)       Electronically Signed By: Tanesha Kemp RN  2022  10:38 AM

## 2022-09-01 NOTE — INTERVAL H&P NOTE
"I have reviewed the surgical (or preoperative) H&P that is linked to this encounter, and examined the patient. There are no significant changes    Clinical Conditions Present on Arrival:  Clinically Significant Risk Factors Present on Admission                   # Overweight: Estimated body mass index is 29.01 kg/m  as calculated from the following:    Height as of 8/29/22: 1.575 m (5' 2\").    Weight as of 8/29/22: 71.9 kg (158 lb 9.6 oz).       "

## 2022-09-01 NOTE — PLAN OF CARE
S:Delivery  B:No Labor,39w0d    No results found for: GBS with antibiotic treatment not indicated 4 hours prior to delivery.  A: Patient delivered Primary C/S for  malposition at 0944 with Dr. MALGORZATA Tolliver in attendance and baby placed on mother's abdomen for immediate cord clamping. Baby to warmer for drying and wrapped in blanket.. Placed skin to skin @ 1030.. Apgars 9/9.Delivery QBL 44.  IV infusion of Oxytocin  infused. Placenta removal expressed. MD does not want placenta sent to pathology.  See Flowsheet for VS and PP checks.  .  Labor care plan goals met, transition now to postpartum care.   R: Expect routine postpartum care. Anticipate first feeding within the hour or whenever infant displays feeding cues. Continue skin to skin. Prior discussion with mother indicates that feeding plan is Breast feeding . Educated mother on importance of exclusive breastfeeding, expected feeding readiness cues and encouraged her to observe for these cues while rooming in. Informed her that breastfeeding assistance would be provided.

## 2022-09-01 NOTE — PROGRESS NOTES
Patient was encouraged to try and get up to see if she could void because it could be contributing to her right sided pain.   Patient was able to ambulate to the bathroom but was complaining of increasing pain on her right side. Patient was unable to sit on toilet due to pain. Patient ambulated back to bed and was straight cathed for 1000 mL's. Patient reports right side feeling much better after straight cath.

## 2022-09-01 NOTE — PLAN OF CARE
She was back to her room by 1030 and has been doing well doing well.  She is tolerating po fluids and solid food well  she was shown football and cross cradle positions for breastfeeding   is helping her and she needed minimal help.  Call light in reach  understands rationale for BG checks and will call prior to feeding  Bonding well.

## 2022-09-01 NOTE — H&P
[facility] Labor and Delivery H&P  2022  Aidee Pope  [id number]      HPI: Aidee Pope is a 26 year old  at 39w0d here for scheduled CS for breech presentation in the setting of velamentous cord between succenturiate lobe. She states that she is feeling well today.  + FM, no ctx, VB, or LOF.  She denies fever, HA, scotoma, nausea, vomiting, CP, SOB, RUQ pain, constipation, diarrhea, and acute swelling.        Pregnancy notable for:  --breech  --succenturiate lobe at fundus with velamentous cord between    OBHX:   OB History    Para Term  AB Living   1 0 0 0 0 0   SAB IAB Ectopic Multiple Live Births   0 0 0 0 0      # Outcome Date GA Lbr Oc/2nd Weight Sex Delivery Anes PTL Lv   1 Current                MedicalHX:   Past Medical History:   Diagnosis Date     Chickenpox        SurgicalHX:   Past Surgical History:   Procedure Laterality Date     WRIST FRACTURE SURGERY Right        Medications:   No current facility-administered medications on file prior to encounter.  ferrous sulfate (FEROSUL) 325 (65 Fe) MG tablet, Take by mouth daily (with breakfast)  magnesium 250 MG tablet, Take 1 tablet by mouth daily  Prenatal Vit-Fe Fumarate-FA (PRENATAL MULTIVITAMIN W/IRON) 27-0.8 MG tablet, Take 1 tablet by mouth daily        Allergies:  Allergies   Allergen Reactions     Amoxicillin Rash     Bee Venom      Wasp Venom Protein      Other reaction(s): Angioedema       FamilyHX:  Family History   Problem Relation Age of Onset     Psychotic Disorder Father 52        suicide     Alcoholism Father      Alzheimer Disease Maternal Grandmother      Heart Disease Maternal Grandfather        SocialHX:   Social History     Socioeconomic History     Marital status:    Tobacco Use     Smoking status: Never Smoker     Smokeless tobacco: Never Used   Vaping Use     Vaping Use: Never used   Substance and Sexual Activity     Alcohol use: Not Currently     Comment: occas-quit with pregnancy      Drug use: Not Currently     Types: Marijuana     Comment: quit with pregnancy     Sexual activity: Yes     Partners: Male   Other Topics Concern     Parent/sibling w/ CABG, MI or angioplasty before 65F 55M? No       ROS: 10-point ROS negative except as in HPI     Physical Exam:  There were no vitals filed for this visit.  GEN: resting comfortably in bed, NAD   CV: RRR, no murmurs  PULM: CTAB, no increased work of breathing, no cough/wheeze   ABD: soft, gravid, non-tender, non-distended  EXT: trace edema, non tender to palpation    NST:  FHT: baseline 120s, moderate variability, + accels, no decels  TOCO: q3-4    Labs:      Lab Results   Component Value Date    AS Negative 2022    HEPBANG Nonreactive 2022    CHPCRT  10/12/2015     Negative   Negative for C. trachomatis rRNA by transcription mediated amplification.   A negative result by transcription mediated amplification does not preclude the   presence of C. trachomatis infection because results are dependent on proper   and adequate collection, absence of inhibitors, and sufficient rRNA to be   detected.      GCPCRT  10/12/2015     Negative   Negative for N. gonorrhoeae rRNA by transcription mediated amplification.   A negative result by transcription mediated amplification does not preclude the   presence of N. gonorrhoeae infection because results are dependent on proper   and adequate collection, absence of inhibitors, and sufficient rRNA to be   detected.      HGB 11.5 (L) 2022       GBS Status:   No results found for: GBS    Lab Results   Component Value Date    PAP NIL 2018       A/P: Aidee Pope is a 26 year old female  at 39w0d here for scheduled CS. Patient has previously had thorough discussions regarding risks, benefits of primary CS vs ECV including concerns regarding velamentous cord. After discussion she elected for PLTCS. US today confirms presentation. Again reviewed risks, benefits of surgery and informed consent  signed.    Admit to L&D. Place PIV. Draw labs: T&S, CBC, RPR   FWB: Category 1FHT.  Continue EFM and toco  Pain: Plan spinal    Malena Tolliver MD   OB/GYN   9/1/2022 9:09 AM

## 2022-09-01 NOTE — PLAN OF CARE
Patient arrived to the [facility] at 0740, for a scheduled primary c/section for breech presentation  She was prepped and ready for surgery and ambulated to the OR at 0905.

## 2022-09-01 NOTE — ANESTHESIA PROCEDURE NOTES
Intrathecal injection Procedure Note    Pre-Procedure   Staff -        CRNA: Vito No APRN CRNA       Other Anesthesia Staff: Tanesha Kemp       Performed By: CRNA and ALEX       Location: OB       Procedure Start/Stop Times: 9/1/2022 9:15 AM and 9/1/2022 9:23 AM       Pre-Anesthestic Checklist: patient identified, IV checked, risks and benefits discussed, informed consent, monitors and equipment checked, pre-op evaluation, at physician/surgeon's request and post-op pain management  Timeout:       Correct Patient: Yes        Correct Procedure: Yes        Correct Site: Yes        Correct Position: Yes   Procedure Documentation  Procedure: intrathecal injection       Patient Position: sitting       Skin prep: Chloraprep       Insertion Site: L4-5. (midline approach).       Needle Gauge: 25.        Needle Length (Inches): 3.5        Spinal Needle Type: Doc tip       Introducer used       Introducer: 20 G       # of attempts: 1 and  # of redirects:  0    Assessment/Narrative         Paresthesias: No.       CSF fluid: clear.    Medication(s) Administered   Medication Administration Time: 9/1/2022 9:15 AM

## 2022-09-02 LAB
HGB BLD-MCNC: 9.9 G/DL (ref 11.7–15.7)
T PALLIDUM AB SER QL: NONREACTIVE

## 2022-09-02 PROCEDURE — 250N000011 HC RX IP 250 OP 636: Performed by: NURSE ANESTHETIST, CERTIFIED REGISTERED

## 2022-09-02 PROCEDURE — 250N000013 HC RX MED GY IP 250 OP 250 PS 637: Performed by: NURSE ANESTHETIST, CERTIFIED REGISTERED

## 2022-09-02 PROCEDURE — 120N000001 HC R&B MED SURG/OB

## 2022-09-02 PROCEDURE — 36415 COLL VENOUS BLD VENIPUNCTURE: CPT | Performed by: OBSTETRICS & GYNECOLOGY

## 2022-09-02 PROCEDURE — 250N000011 HC RX IP 250 OP 636: Performed by: OBSTETRICS & GYNECOLOGY

## 2022-09-02 PROCEDURE — 250N000013 HC RX MED GY IP 250 OP 250 PS 637: Performed by: OBSTETRICS & GYNECOLOGY

## 2022-09-02 PROCEDURE — 85018 HEMOGLOBIN: CPT | Performed by: OBSTETRICS & GYNECOLOGY

## 2022-09-02 RX ADMIN — IBUPROFEN 800 MG: 800 TABLET ORAL at 15:52

## 2022-09-02 RX ADMIN — DIPHENHYDRAMINE HYDROCHLORIDE 25 MG: 25 CAPSULE ORAL at 01:18

## 2022-09-02 RX ADMIN — OXYCODONE HYDROCHLORIDE 5 MG: 5 TABLET ORAL at 22:04

## 2022-09-02 RX ADMIN — ACETAMINOPHEN 975 MG: 325 TABLET, FILM COATED ORAL at 09:38

## 2022-09-02 RX ADMIN — OXYCODONE HYDROCHLORIDE 5 MG: 5 TABLET ORAL at 18:05

## 2022-09-02 RX ADMIN — ACETAMINOPHEN 975 MG: 325 TABLET, FILM COATED ORAL at 03:45

## 2022-09-02 RX ADMIN — OXYCODONE HYDROCHLORIDE 5 MG: 5 TABLET ORAL at 14:08

## 2022-09-02 RX ADMIN — IBUPROFEN 800 MG: 800 TABLET ORAL at 22:04

## 2022-09-02 RX ADMIN — SENNOSIDES AND DOCUSATE SODIUM 1 TABLET: 50; 8.6 TABLET ORAL at 09:38

## 2022-09-02 RX ADMIN — IBUPROFEN 800 MG: 800 TABLET ORAL at 09:38

## 2022-09-02 RX ADMIN — SENNOSIDES AND DOCUSATE SODIUM 1 TABLET: 50; 8.6 TABLET ORAL at 22:04

## 2022-09-02 RX ADMIN — SIMETHICONE 80 MG: 80 TABLET, CHEWABLE ORAL at 22:05

## 2022-09-02 RX ADMIN — OXYCODONE HYDROCHLORIDE 5 MG: 5 TABLET ORAL at 03:45

## 2022-09-02 RX ADMIN — ACETAMINOPHEN 975 MG: 325 TABLET, FILM COATED ORAL at 22:04

## 2022-09-02 RX ADMIN — HYDROMORPHONE HYDROCHLORIDE 0.2 MG: 0.2 INJECTION, SOLUTION INTRAMUSCULAR; INTRAVENOUS; SUBCUTANEOUS at 01:03

## 2022-09-02 RX ADMIN — KETOROLAC TROMETHAMINE 30 MG: 30 INJECTION, SOLUTION INTRAMUSCULAR; INTRAVENOUS at 03:45

## 2022-09-02 RX ADMIN — ACETAMINOPHEN 975 MG: 325 TABLET, FILM COATED ORAL at 15:52

## 2022-09-02 ASSESSMENT — ACTIVITIES OF DAILY LIVING (ADL)
ADLS_ACUITY_SCORE: 23
ADLS_ACUITY_SCORE: 23
ADLS_ACUITY_SCORE: 20
ADLS_ACUITY_SCORE: 23
ADLS_ACUITY_SCORE: 20
ADLS_ACUITY_SCORE: 23
ADLS_ACUITY_SCORE: 20

## 2022-09-02 NOTE — PLAN OF CARE
Data: Vital signs within normal limits. Postpartum checks within normal limits - see flow record. Patient  Is tolerating po intake. Patient is able to empty bladder independently. . Patient ambulating independently..   No apparent signs of infection. Incision healing well. Patient Is performing self cares and Is able to care for infant. Positive attachment behaviors are observed with infant. Support persons are present.  Action:  Pain plan was discussed. Patient would like pain meds to be brought in when they are due. Patient was medicated during the shift for pain and cramping. See MAR.Patient education done about breastfeeding, formula feeding,  cares, postpartum cares, pain management/plan, and rest. See flow record.  Response:   Patient reassessed within 1 hour after each medication for pain. Patient stated that pain had improved. Patient stated that she was comfortable. .   Plan: Anticipate discharge on 9/3/22.

## 2022-09-02 NOTE — PLAN OF CARE
Pt was up to bathroom and unable to void.  Pt then instructed to take a walk around unit to see if that would help.  Upon returning- pt was able to void while using kingston- bottle.  500 Ml's of urine output.  Pt tolerated well, and felt relieved.

## 2022-09-02 NOTE — PLAN OF CARE
Goal Outcome Evaluation:  Pts vital signs are stable.  Tolerating food and fluids.  Pt walked in hallway x1.  Breastfeeding on demand.  Incision C/D/I.  Pt is taking ibuprofen/tylenol and oxycodone with fair relief.   at bedside/attentive to needs.

## 2022-09-02 NOTE — PLAN OF CARE
This writer assumes care of pt at 1515 hours.  Report received from Marilyn ORTA RN.  Pt up ad pamella in room and is independent with self cares.  Pt complains of pain and accept scheduled ibuprofen and tylenol.  Pt will call for additional PRN pain medication if needed.   Pt using abdominal binder for comfort.  Pt encouraged to ambulate more frequently in room and in louise.  Plan: continue to monitor and assess.

## 2022-09-02 NOTE — PROGRESS NOTES
[facility] OB/GYN Department    Post-Partum Progress Note: PPD #1    Name: Aidee Pope  Date: 9/2/2022    Subjective:   Patient seen and examined.  No complaints.  Pain well controlled.  Tolerating regular diet, no nausea or vomiting.  Ambulating and voiding without difficulty.  Lochia mild.  Breast  feeding.     ROS:    General/Constitutional:  Denies chills or fever  Respiratory: Denies shortness of breath  Cardiovascular: Denies chest pain  Gastrointestinal:  +mild uterine cramping, no nausea or vomiting  Genitourinary: Denies difficulty urinating  Musculoskeletal: Denies  peripheral edema      Objective:     Intake/Output Summary (Last 24 hours) at 9/2/2022 0929  Last data filed at 9/2/2022 0400  Gross per 24 hour   Intake 1050 ml   Output 3285 ml   Net -2235 ml       Patient Vitals for the past 24 hrs:   BP Temp Temp src Pulse Resp SpO2   09/02/22 0826 103/62 -- -- 73 16 97 %   09/02/22 0348 102/62 97.8  F (36.6  C) -- 61 16 97 %   09/02/22 0058 106/67 98.6  F (37  C) Oral 69 16 98 %   09/01/22 1944 104/60 97.9  F (36.6  C) Oral 71 -- 96 %   09/01/22 1730 110/65 -- -- 75 16 97 %   09/01/22 1630 108/71 -- -- 85 16 99 %   09/01/22 1530 108/61 97.8  F (36.6  C) Oral 90 16 98 %   09/01/22 1500 -- -- -- -- -- 97 %   09/01/22 1445 -- -- -- -- -- 98 %   09/01/22 1430 114/69 97.5  F (36.4  C) -- 77 16 98 %   09/01/22 1345 108/66 97.5  F (36.4  C) Oral 65 -- 97 %   09/01/22 1330 -- -- -- -- -- 97 %   09/01/22 1300 -- -- -- -- -- 97 %   09/01/22 1230 101/60 -- -- 70 -- 97 %   09/01/22 1220 96/52 -- -- -- -- 97 %   09/01/22 1200 99/63 -- -- 67 -- 96 %   09/01/22 1145 97/50 -- -- 63 -- 98 %   09/01/22 1130 106/54 -- -- 65 -- 98 %   09/01/22 1115 98/61 -- -- 67 -- 98 %   09/01/22 1100 100/60 -- -- 71 -- 98 %   09/01/22 1045 104/60 97.5  F (36.4  C) Oral 69 16 98 %       Recent Labs   Lab 09/02/22  0615 09/01/22  0834   HGB 9.9* 11.8       General appearance: well-hydrated, A&O x 3, no apparent  distress  ENT: EOMI, sclera anicteric   Lungs: Equal expansion bilaterally, no accessory muscle use  Heart: No heaves or thrills. No peripheral varicosities  Constitutional: See vitals  Abdomen: Soft, non-distended, no rebound or rigidity   Uterus: Firm at umbilicus with non-tender fundus   Neurologic: CN II-XII grossly intact, no lateralizing defects, no gross movement abnormalities  Extremities: trace edema, no calf tenderness    Assessment and Plan:    26 year old   s/p  delivery  Routine postpartum cares      Debbie Boggs MD

## 2022-09-02 NOTE — PLAN OF CARE
Pt assisted to bathroom via stand by assist.  Pt sat in bathroom for 20 min and was unable to void even after interventions of blowing on straw, listening to water run- pt then assisted back to bed and then straight cathed via sterile technique.  1100 ML's of urine output, pt tolerated well.  Urine medium jackson colored.  Pt encouraged to continue to drink plenty of fluids.

## 2022-09-03 PROCEDURE — 120N000001 HC R&B MED SURG/OB

## 2022-09-03 PROCEDURE — 250N000013 HC RX MED GY IP 250 OP 250 PS 637: Performed by: OBSTETRICS & GYNECOLOGY

## 2022-09-03 RX ORDER — OXYCODONE HYDROCHLORIDE 5 MG/1
5-10 TABLET ORAL EVERY 4 HOURS PRN
Status: DISCONTINUED | OUTPATIENT
Start: 2022-09-03 | End: 2022-09-04 | Stop reason: HOSPADM

## 2022-09-03 RX ADMIN — OXYCODONE HYDROCHLORIDE 5 MG: 5 TABLET ORAL at 22:09

## 2022-09-03 RX ADMIN — OXYCODONE HYDROCHLORIDE 5 MG: 5 TABLET ORAL at 03:19

## 2022-09-03 RX ADMIN — IBUPROFEN 800 MG: 800 TABLET ORAL at 22:05

## 2022-09-03 RX ADMIN — IBUPROFEN 800 MG: 800 TABLET ORAL at 03:19

## 2022-09-03 RX ADMIN — SENNOSIDES AND DOCUSATE SODIUM 1 TABLET: 50; 8.6 TABLET ORAL at 08:32

## 2022-09-03 RX ADMIN — IBUPROFEN 800 MG: 800 TABLET ORAL at 15:55

## 2022-09-03 RX ADMIN — OXYCODONE HYDROCHLORIDE 5 MG: 5 TABLET ORAL at 10:25

## 2022-09-03 RX ADMIN — IBUPROFEN 800 MG: 800 TABLET ORAL at 10:20

## 2022-09-03 RX ADMIN — SENNOSIDES AND DOCUSATE SODIUM 1 TABLET: 50; 8.6 TABLET ORAL at 22:05

## 2022-09-03 RX ADMIN — ACETAMINOPHEN 975 MG: 325 TABLET, FILM COATED ORAL at 03:18

## 2022-09-03 RX ADMIN — ACETAMINOPHEN 975 MG: 325 TABLET, FILM COATED ORAL at 15:55

## 2022-09-03 RX ADMIN — SIMETHICONE 80 MG: 80 TABLET, CHEWABLE ORAL at 16:14

## 2022-09-03 RX ADMIN — OXYCODONE HYDROCHLORIDE 5 MG: 5 TABLET ORAL at 16:14

## 2022-09-03 RX ADMIN — ACETAMINOPHEN 975 MG: 325 TABLET, FILM COATED ORAL at 10:20

## 2022-09-03 RX ADMIN — ACETAMINOPHEN 975 MG: 325 TABLET, FILM COATED ORAL at 22:05

## 2022-09-03 RX ADMIN — OXYCODONE HYDROCHLORIDE 5 MG: 5 TABLET ORAL at 12:37

## 2022-09-03 ASSESSMENT — ACTIVITIES OF DAILY LIVING (ADL)
ADLS_ACUITY_SCORE: 20

## 2022-09-03 NOTE — PROGRESS NOTES
[facility] OB/GYN Department    Post-Partum Progress Note: PPD #2    Name: Aidee Pope  Date: 9/3/2022    Subjective:   Patient seen and examined.  No complaints.  Pain well controlled.  Tolerating regular diet, no nausea or vomiting.  Ambulating and voiding without difficulty.  Lochia mild.  breast feeding and using donor milk.     ROS:    General/Constitutional:  Denies chills or fever  Respiratory: Denies shortness of breath  Cardiovascular: Denies chest pain  Gastrointestinal:  +mild uterine cramping, no nausea or vomiting  Genitourinary: Denies difficulty urinating  Musculoskeletal: pos peripheral edema      Objective:   No intake or output data in the 24 hours ending 22 0759    Patient Vitals for the past 24 hrs:   BP Temp Temp src Pulse Resp SpO2   22 0003 100/57 97.8  F (36.6  C) Oral 86 18 97 %   22 1554 113/64 98.4  F (36.9  C) Oral 101 16 --   22 0826 103/62 98.4  F (36.9  C) Oral 73 16 97 %       Recent Labs   Lab 22  0615 22  0834   HGB 9.9* 11.8       General appearance: well-hydrated, A&O x 3, no apparent distress  ENT: EOMI, sclera anicteric   Lungs: Equal expansion bilaterally, no accessory muscle use  Heart: No heaves or thrills. No peripheral varicosities  Constitutional: See vitals  Abdomen: Soft, non-distended, no rebound or rigidity   Uterus: Firm 2 cm below umbilicus with non-tender fundus   Neurologic: CN II-XII grossly intact, no lateralizing defects, no gross movement abnormalities  Extremities: trace edema, no calf tenderness    Assessment and Plan:    26 year old   s/p  delivery, PPD # 2.  Routine postpartum cares  Plan discharge tomorrow    Debbie Boggs MD

## 2022-09-03 NOTE — PROGRESS NOTES
Pt's pain is controlled with PO meds.  Pt is using lanolin and breast hydrogels.  Nipples are not cracked or bleeding and baby is latching well per pt.  Will assist with latch as needed.

## 2022-09-03 NOTE — PLAN OF CARE
Data: Vital signs within normal limits. Postpartum checks within normal limits - see flow record. Patient  is tolerating po intake. Patient is able to empty bladder independently. . Patient ambulating independently. No apparent signs of infection. Incision healing well. Patient is performing self cares and is able to care for infant. Positive attachment behaviors are observed with infant. Support persons are present.    Action:  Pain plan was discussed. Patient will request pain med when she is ready for it. Patient was medicated during the shift for pain. See MAR.Patient education done about hand hygiene, breastfeeding,  cares, pain management/plan, fall risk, and rest. See flow record.    Response:   Patient reassessed within 1 hour after each medication for pain. Patient stated that pain had improved. Patient stated that she was comfortable. .     Plan: Anticipate discharge on 22.     Nessa Hart RN on 9/3/2022 at 5:07 AM

## 2022-09-04 VITALS
RESPIRATION RATE: 16 BRPM | SYSTOLIC BLOOD PRESSURE: 124 MMHG | TEMPERATURE: 98.1 F | HEART RATE: 89 BPM | OXYGEN SATURATION: 98 % | DIASTOLIC BLOOD PRESSURE: 81 MMHG

## 2022-09-04 PROCEDURE — 250N000013 HC RX MED GY IP 250 OP 250 PS 637: Performed by: OBSTETRICS & GYNECOLOGY

## 2022-09-04 RX ORDER — AMOXICILLIN 250 MG
1 CAPSULE ORAL 2 TIMES DAILY
Qty: 30 TABLET | Refills: 1 | Status: SHIPPED | OUTPATIENT
Start: 2022-09-04 | End: 2024-04-24

## 2022-09-04 RX ORDER — IBUPROFEN 800 MG/1
800 TABLET, FILM COATED ORAL EVERY 8 HOURS PRN
Qty: 30 TABLET | Refills: 1 | Status: SHIPPED | OUTPATIENT
Start: 2022-09-04 | End: 2024-04-24

## 2022-09-04 RX ORDER — OXYCODONE HYDROCHLORIDE 5 MG/1
5-10 TABLET ORAL EVERY 4 HOURS PRN
Qty: 12 TABLET | Refills: 0 | Status: SHIPPED | OUTPATIENT
Start: 2022-09-04 | End: 2024-04-24

## 2022-09-04 RX ORDER — ACETAMINOPHEN 500 MG
1000 TABLET ORAL EVERY 6 HOURS
Qty: 30 TABLET | Refills: 1 | Status: SHIPPED | OUTPATIENT
Start: 2022-09-04 | End: 2024-04-24

## 2022-09-04 RX ADMIN — SENNOSIDES AND DOCUSATE SODIUM 1 TABLET: 50; 8.6 TABLET ORAL at 08:42

## 2022-09-04 RX ADMIN — IBUPROFEN 800 MG: 800 TABLET ORAL at 10:35

## 2022-09-04 RX ADMIN — OXYCODONE HYDROCHLORIDE 5 MG: 5 TABLET ORAL at 08:43

## 2022-09-04 RX ADMIN — ACETAMINOPHEN 975 MG: 325 TABLET, FILM COATED ORAL at 10:35

## 2022-09-04 RX ADMIN — SIMETHICONE 80 MG: 80 TABLET, CHEWABLE ORAL at 05:58

## 2022-09-04 RX ADMIN — ACETAMINOPHEN 975 MG: 325 TABLET, FILM COATED ORAL at 04:05

## 2022-09-04 RX ADMIN — IBUPROFEN 800 MG: 800 TABLET ORAL at 04:05

## 2022-09-04 ASSESSMENT — ACTIVITIES OF DAILY LIVING (ADL)
ADLS_ACUITY_SCORE: 20

## 2022-09-04 NOTE — DISCHARGE INSTRUCTIONS
Postop  Birth Instructions    Activity     Do not lift more than 10 pounds for 6 weeks after surgery.  Ask family and friends for help when you need it.  No driving until you have stopped taking your pain medications (usually two weeks after surgery).  No heavy exercise or activity for 6 weeks.  Don't do anything that will put a strain on your surgery site.  Don't strain when using the toilet.  Your care team may prescribe a stool softener if you have problems with your bowel movements.     To care for your incision:     Keep the incision clean and dry.  Do not soak your incision in water. No swimming or hot tubs until it has fully healed. You may soak in the bathtub if the water level is below your incision.  Do not use peroxide, gel, cream, lotion, or ointment on your incision.  Adjust your clothes to avoid pressure on your surgery site (check the elastic in your underwear for example).     You may see a small amount of clear or pink drainage and this is normal.  Check with your health care provider:     If the drainage increases or has an odor.  If the incision reddens, you have swelling, or develop a rash.  If you have increased pain and the medicine we prescribed doesn't help.  If you have a fever above 100.4 F (38 C) with or without chills when placing thermometer under your tongue.   The area around your incision (surgery wound), will feel numb.  This is normal. The numbness should go away in less than a year.     Keep your hands clean:  Always wash your hands before touching your incision (surgery wound). This helps reduce your risk of infection. If your hands aren't dirty, you may use an alcohol hand-rub to clean your hands. Keep your nails clean and short.    Call your healthcare provider if you have any of these symptoms:     You soak a sanitary pad with blood within 1 hour, or you see blood clots larger than a golf ball.  Bleeding that lasts more than 6 weeks.  Vaginal discharge that smells  bad.  Severe pain, cramping or tenderness in your lower belly area.  A need to urinate more frequently (use the toilet more often), more urgently (use the toilet very quickly), or it burns when you urinate.  Nausea and vomiting.  Redness, swelling or pain around a vein in your leg.  Problems breastfeeding or a red or painful area on your breast.  Chest pain and cough or are gasping for air.  Problems with coping with sadness, anxiety or depression. If you have concerns about hurting yourself or the baby, call your provider immediately.    You have questions or concerns after you return home.     For problems or questions with breastfeeding after discharge call: [phone] at the Peds clinic.  After hours you may leave a message and your call will returned the next morning. You may also call the [facility] to answer questions, [phone].   If you you feel sad, have difficulty sleeping, feel overwhelmed, anxious or have troubling thoughts you may call your clinic, or the HelpLine for Pregnancy & Postpartum Support [place]. ([facility] HelpLine 168-579-9608) or online resource list  @ www.[place]support[place].org

## 2022-09-04 NOTE — PLAN OF CARE
Data: Vital signs within normal limits. Postpartum checks within normal limits - see flow record. Patient  Is tolerating po intake. Patient is able to empty bladder independently. . Patient ambulating independently..   No apparent signs of infection. Incision healing well. Patient Is performing self cares and Is able to care for infant. Positive attachment behaviors are observed with infant. Support persons are present.  Action:  Pain plan was discussed. Pain meds, post  are scheduled and will be brought in when they are due. Additional narcotics will be administered prn. Patient was medicated during the shift for pain. See MAR.Patient education done about breastfeeding,  cares, postpartum cares, pain management/plan, rest and discharge from hospital. See flow record.  Response:   Patient reassessed within 1 hour after each medication for pain. Patient stated that pain had improved. Patient stated that she was comfortable. .   Plan: Anticipate discharge on 22    Report given to DOUGLAS Robert.

## 2022-09-04 NOTE — PLAN OF CARE
Data: Vital signs within normal limits. Postpartum checks within normal limits - see flow record. Patient  is tolerating po intake. Patient is able to empty bladder independently. . Patient ambulating independently..   No apparent signs of infection. Incision healing well. Patient is performing self cares and is able to care for infant. Positive attachment behaviors are observed with infant. Support persons are present.    Action:  Pain plan was discussed. Patient will request pain med when she is ready for it. Patient was medicated during the shift for pain. See MAR.Patient education done about rest. See flow record.    Response:   Patient reassessed within 1 hour after each medication for pain. Patient stated that pain had improved. Patient stated that she was comfortable. .     Plan: Anticipate discharge on 09/04/22.     Nessa Hart RN on 9/4/2022 at 5:23 AM

## 2022-09-04 NOTE — PLAN OF CARE
Patient discharged per wheelchair with infant in car seat. Mother verified that her band matches her infant's band by comparing the infant's #.  Discharge instructions reviewed. Encouraged to call for any problems, questions or concerns. RXs picked up at [facility].

## 2022-09-04 NOTE — DISCHARGE SUMMARY
[facility]  Delivery Discharge Summary    Admit date: 2022  Discharge date: 2022     Admit Dx:   - 26 year old y/o  at 39w0d   - Breech presentation      Discharge Dx:  - Same as above, s/p primary low transverse  section, not present on admission  -acute anemia secondary to blood loss, not present on admission  -Acute Blood Loss Anemia due to blood loss at surgery as evidenced by hemoglobin drop from 11.8 to 9.9.          Procedures:  - Repeat low transverse  section   - Spinal analgesia      Admit HPI:  Ms. Aidee Pope is a 26 year old  at 39w0d who was admitted on 2022 for a scheduled  section.  Please see her admit H&P for full details of her PMH, PSH, Meds, Allergies and exam on admit.    Hospital course:  Aidee Pope was admitted for a primary  section due to breech presentation.  Her delivery and postartum care were uncomplicated.  She was discharged on POD#3.    Indications for :    Please see her  Section Operative Note for full details regarding her delivery.    Her postoperative course was uncomplicated. On POD#3, she was meeting all of her postpartum goals and deemed stable for discharge. She was voiding without difficulty, tolerating a regular diet without nausea and vomiting, her pain was well controlled on oral pain medicines and her lochia was appropriate. Her hemoglobin after delivery was 9.9. Her Rh status was A+ and Rhogam was not indicated. At the time of discharge, she was breast feeding her infant.     Physical exam on the day of discharge:  Vitals:    22 0826 22 1547 22 0118 22 0454   BP: 119/80 117/77 122/82    BP Location:  Left arm Left arm    Patient Position:  Semi-Christopher's Semi-Christopher's    Cuff Size:  Adult Regular Adult Regular    Pulse:  92 74    Resp: 18 18 18 17   Temp: 97.4  F (36.3  C) 98.1  F (36.7  C) 97.7  F (36.5  C)    TempSrc: Oral Oral Oral     SpO2: 99% 99% 98%        General: sitting up, alert and cooperative  Abd: soft, non-distended, non-tender. Fundus firm, nontender, 2 cm below umbilicus.   Incision clean/dry/intact with dermabond in place.  Extremities: calves nontender, neg edema of lower extremities bilaterally    Lab Results   Component Value Date    HGB 9.9 09/02/2022    HGB 11.8 09/01/2022    HGB 13.4 12/14/2017    HGB 13.5 12/10/2016     Blood type: No results found for: RH    Discharge/Disposition:  Aidee Pope was discharged to home in stable condition with the following instructions/medications:  1) Call for temperature > 100.4, foul smelling vaginal discharge, bleeding > 1 pad per hour x 2 hrs, pain not controlled by oral pain meds, severe constipation or severe nausea or vomiting.  2) She received contraceptive counseling.  3) She was instructed to follow-up with her primary OB in 6 weeks for a routine postpartum visit and incision check.  4) She was instructed to continue her PNV on discharge if she wished to breast feed her infant.  5) She was discharged home with the following medications: Ibuprofen, Senna, Tylenol and Oxycodone    Debbie Boggs MD   [facility] OB/[facility]

## 2022-09-21 ENCOUNTER — PRENATAL OFFICE VISIT (OUTPATIENT)
Dept: OBGYN | Facility: CLINIC | Age: 27
End: 2022-09-21
Payer: COMMERCIAL

## 2022-09-21 VITALS
HEART RATE: 98 BPM | BODY MASS INDEX: 28.34 KG/M2 | RESPIRATION RATE: 14 BRPM | DIASTOLIC BLOOD PRESSURE: 82 MMHG | HEIGHT: 62 IN | TEMPERATURE: 98.5 F | SYSTOLIC BLOOD PRESSURE: 123 MMHG | WEIGHT: 154 LBS

## 2022-09-21 DIAGNOSIS — T81.89XA PROBLEM INVOLVING SURGICAL INCISION: Primary | ICD-10-CM

## 2022-09-21 PROCEDURE — 99024 POSTOP FOLLOW-UP VISIT: CPT | Performed by: OBSTETRICS & GYNECOLOGY

## 2022-09-21 ASSESSMENT — ANXIETY QUESTIONNAIRES
5. BEING SO RESTLESS THAT IT IS HARD TO SIT STILL: NOT AT ALL
1. FEELING NERVOUS, ANXIOUS, OR ON EDGE: NOT AT ALL
2. NOT BEING ABLE TO STOP OR CONTROL WORRYING: NOT AT ALL
7. FEELING AFRAID AS IF SOMETHING AWFUL MIGHT HAPPEN: NOT AT ALL
6. BECOMING EASILY ANNOYED OR IRRITABLE: NOT AT ALL
3. WORRYING TOO MUCH ABOUT DIFFERENT THINGS: NOT AT ALL
GAD7 TOTAL SCORE: 0
GAD7 TOTAL SCORE: 0

## 2022-09-21 ASSESSMENT — PATIENT HEALTH QUESTIONNAIRE - PHQ9
SUM OF ALL RESPONSES TO PHQ QUESTIONS 1-9: 0
5. POOR APPETITE OR OVEREATING: NOT AT ALL

## 2022-09-21 NOTE — PROGRESS NOTES
"Initial /82 (BP Location: Left arm, Patient Position: Chair, Cuff Size: Adult Regular)   Pulse 98   Temp 98.5  F (36.9  C) (Tympanic)   Resp 14   Ht 1.575 m (5' 2\")   Wt 69.9 kg (154 lb)   LMP 11/22/2021   Breastfeeding Yes   BMI 28.17 kg/m   Estimated body mass index is 28.17 kg/m  as calculated from the following:    Height as of this encounter: 1.575 m (5' 2\").    Weight as of this encounter: 69.9 kg (154 lb). .      "

## 2022-09-21 NOTE — PROGRESS NOTES
"[facility]    CC: Incision check    S: Feels a lump on upper left side of incision. Not particularly painful. Eating, drinking, urinating and moving bowels without any issues. Using ibuprofen, tylenol and ice packs prn. No fever.   O:   VS: Patient Vitals for the past 24 hrs:   BP Temp Temp src Pulse Resp Height Weight   09/21/22 0954 123/82 98.5  F (36.9  C) Tympanic 98 14 1.575 m (5' 2\") 69.9 kg (154 lb)     General: NAD  CV: Regular rate, warm and well perfused  Resp: breathing comfortably on room air   Abdomen: soft, non-tender, nondistended  Incison: well-healed, intact, small subcu mass c/w facial suture knot, noted bilaterally L>R    A/P: 25 yo P1, 2 wks pp who presents for an incision check  Incision healing well, subcu masses are facial suture knots and normal. No evidence of hernia.   RTC for 6 wks pp visit.       Alexia Samuel MD, MD  [facility]   9/21/2022 10:18 AM    "

## 2022-10-03 ENCOUNTER — MEDICAL CORRESPONDENCE (OUTPATIENT)
Dept: HEALTH INFORMATION MANAGEMENT | Facility: CLINIC | Age: 27
End: 2022-10-03

## 2022-10-07 ENCOUNTER — HOSPITAL ENCOUNTER (EMERGENCY)
Facility: CLINIC | Age: 27
Discharge: HOME | End: 2022-10-07
Attending: NURSE PRACTITIONER | Admitting: NURSE PRACTITIONER
Payer: COMMERCIAL

## 2022-10-07 VITALS
SYSTOLIC BLOOD PRESSURE: 138 MMHG | TEMPERATURE: 98.1 F | HEART RATE: 110 BPM | OXYGEN SATURATION: 99 % | DIASTOLIC BLOOD PRESSURE: 76 MMHG | WEIGHT: 151.6 LBS | BODY MASS INDEX: 27.73 KG/M2

## 2022-10-07 DIAGNOSIS — N64.4 BREAST TENDERNESS: ICD-10-CM

## 2022-10-07 PROCEDURE — G0463 HOSPITAL OUTPT CLINIC VISIT: HCPCS | Performed by: NURSE PRACTITIONER

## 2022-10-08 NOTE — ED PROVIDER NOTES
History     Chief Complaint   Patient presents with     Breast Problem     Burning and pain when breastfeeding started wednesday. Pt wasn't feeling great yesterday, and noticed shes not producing as much milk. Pt also has what looks like bug bites on her arms.      HPI  Aidee Pope is a 26 year old female who presents to the urgent care for evaluation of breast tenderness. Patient is about 5 weeks post partum and has noticed decrease in milk production. She has felt the breasts feel warmer and having increasing tenderness with let down. No fevers, point tenderness, abnormal discharge, firm areas/clogged ducts. She also recently noticed sporadic bumps on both arms that are itchy,  with similar bumps.     Allergies:  Allergies   Allergen Reactions     Amoxicillin Rash     Bee Venom      Wasp Venom Protein      Other reaction(s): Angioedema       Problem List:    Patient Active Problem List    Diagnosis Date Noted     S/P  section 2022     Priority: Medium     Velamentous insertion of umbilical cord in third trimester 2022     Priority: Medium     Placenta succenturiata in third trimester 2022     Priority: Medium     Prenatal care, first pregnancy 01/10/2022     Priority: Medium     Chronic tension-type headache, not intractable 2016     Priority: Medium     Generalized anxiety disorder 2011     Priority: Medium     Diagnosis updated by automated process. Provider to review and confirm.       Moderate major depression (H) 2011     Priority: Medium        Past Medical History:    Past Medical History:   Diagnosis Date     Chickenpox        Past Surgical History:    Past Surgical History:   Procedure Laterality Date      SECTION N/A 2022    Procedure:  SECTION;  Surgeon: Malena Tolliver MD;  Location: [facility] OR     WRIST FRACTURE SURGERY Right        Family History:    Family History   Problem Relation Age of Onset     Psychotic  Disorder Father 52        suicide     Alcoholism Father      Alzheimer Disease Maternal Grandmother      Heart Disease Maternal Grandfather        Social History:  Marital Status:   [2]  Social History     Tobacco Use     Smoking status: Never Smoker     Smokeless tobacco: Never Used   Vaping Use     Vaping Use: Never used   Substance Use Topics     Alcohol use: Not Currently     Comment: occas-quit with pregnancy     Drug use: Not Currently     Types: Marijuana     Comment: quit with pregnancy        Medications:    acetaminophen (TYLENOL) 500 MG tablet  ferrous sulfate (FEROSUL) 325 (65 Fe) MG tablet  ibuprofen (ADVIL/MOTRIN) 800 MG tablet  magnesium 250 MG tablet  oxyCODONE (ROXICODONE) 5 MG tablet  Prenatal Vit-Fe Fumarate-FA (PRENATAL MULTIVITAMIN W/IRON) 27-0.8 MG tablet  senna-docusate (SENOKOT-S/PERICOLACE) 8.6-50 MG tablet          Review of Systems   Genitourinary:        Breast tenderness   Skin: Positive for rash.   All other systems reviewed and are negative.      Physical Exam   BP: 138/76  Pulse: 110  Temp: 98.1  F (36.7  C)  Weight: 68.8 kg (151 lb 9.6 oz)  SpO2: 99 %      Physical Exam  Constitutional:       General: She is not in acute distress.     Appearance: Normal appearance.   Eyes:      Conjunctiva/sclera: Conjunctivae normal.      Pupils: Pupils are equal, round, and reactive to light.   Cardiovascular:      Rate and Rhythm: Normal rate.   Pulmonary:      Effort: Pulmonary effort is normal.   Chest:   Breasts: Breasts are symmetrical.      Right: No swelling, nipple discharge, skin change or tenderness.      Left: No swelling, nipple discharge, skin change or tenderness.       Musculoskeletal:         General: Normal range of motion.      Cervical back: Normal range of motion.   Skin:     General: Skin is warm.      Capillary Refill: Capillary refill takes less than 2 seconds.      Comments: Sporadic insect bites on bilateral arms   Neurological:      General: No focal deficit  present.      Mental Status: She is alert.       ED Course                 Procedures    No results found for this or any previous visit (from the past 24 hour(s)).    Medications - No data to display    Assessments & Plan (with Medical Decision Making)   Aidee Pope is a 26 year old female who presents to the urgent care for evaluation of breast tenderness. Patient is about 5 weeks post partum and has noticed decrease in milk production. She has felt the breasts feel warmer and having increasing tenderness with let down. No fevers, point tenderness, abnormal discharge, firm areas/clogged ducts. She also recently noticed sporadic bumps on both arms that are itchy,  with similar bumps. Vitals normal, exam as above. No evidence of mastitis at this time, has OBGYN follow up next week. Close monitoring for development of erythema, point tenderness as opposed to diffuse bilateral breast tenderness, body aches, fevers. Rash consistent with insect bites, likely from her pets given area of bites. Return precautions reviewed, all questions answered. Patient is agreeable to plan of care and discharged in no acute distress.     I have reviewed the nursing notes.    I have reviewed the findings, diagnosis, plan and need for follow up with the patient.      Discharge Medication List as of 10/7/2022  6:00 PM          Final diagnoses:   Breast tenderness       10/7/2022   [facility] EMERGENCY DEPT     Myah Thomas, FARA CNP  10/07/22 2000

## 2022-10-10 ENCOUNTER — PRENATAL OFFICE VISIT (OUTPATIENT)
Dept: OBGYN | Facility: CLINIC | Age: 27
End: 2022-10-10
Payer: COMMERCIAL

## 2022-10-10 VITALS
BODY MASS INDEX: 28.45 KG/M2 | TEMPERATURE: 98.1 F | RESPIRATION RATE: 14 BRPM | DIASTOLIC BLOOD PRESSURE: 85 MMHG | HEART RATE: 103 BPM | HEIGHT: 62 IN | WEIGHT: 154.6 LBS | SYSTOLIC BLOOD PRESSURE: 126 MMHG

## 2022-10-10 PROCEDURE — 99207 PR POST PARTUM EXAM: CPT | Performed by: OBSTETRICS & GYNECOLOGY

## 2022-10-10 NOTE — PROGRESS NOTES
"[facility]    CC: Postpartum Visit    S: Pt doing well. She denies any complaints. Off any pain medications. Eating, drinking, urinating and moving bowels without any issues. Lochia has resolved and she has had her period. Had breast tenderness that resolved. Mood is fine.     O:   VS: Patient Vitals for the past 24 hrs:   BP Temp Temp src Pulse Resp Height Weight   10/10/22 0922 126/85 98.1  F (36.7  C) Tympanic 103 14 1.575 m (5' 2.01\") 70.1 kg (154 lb 9.6 oz)     General: NAD  CV: Regular rate, warm and well perfused  Resp: breathing comfortably on room air   Abdomen: soft, non-tender, nondistended  Incision is c/d/i  Extremities: non-tender, non-edematous     A/P: 26 year old now P1, 6 wks pp   Appropriate postpartum recovery.   Plan for condoms for contraception.     Plan for routine GYN cares, annual exam.       Alexia Samuel MD, MD  [facility]   10/10/2022 9:26 AM    "

## 2022-10-10 NOTE — PROGRESS NOTES
"Initial /85 (BP Location: Left arm, Patient Position: Chair, Cuff Size: Adult Regular)   Pulse 103   Temp 98.1  F (36.7  C) (Tympanic)   Resp 14   Ht 1.575 m (5' 2.01\")   Wt 70.1 kg (154 lb 9.6 oz)   LMP 11/22/2021   Breastfeeding Yes   BMI 28.27 kg/m   Estimated body mass index is 28.27 kg/m  as calculated from the following:    Height as of this encounter: 1.575 m (5' 2.01\").    Weight as of this encounter: 70.1 kg (154 lb 9.6 oz). .      "

## 2022-11-01 ENCOUNTER — MEDICAL CORRESPONDENCE (OUTPATIENT)
Dept: HEALTH INFORMATION MANAGEMENT | Facility: CLINIC | Age: 27
End: 2022-11-01

## 2022-11-19 ENCOUNTER — HEALTH MAINTENANCE LETTER (OUTPATIENT)
Age: 27
End: 2022-11-19

## 2023-03-09 NOTE — TELEPHONE ENCOUNTER
Message left for patient to check the pharmacy.  Needs office visit for further refills. Vanesa LOUIE RN     Fluconazole Pregnancy And Lactation Text: This medication is Pregnancy Category C and it isn't know if it is safe during pregnancy. It is also excreted in breast milk.

## 2023-03-22 ENCOUNTER — HOSPITAL ENCOUNTER (EMERGENCY)
Facility: CLINIC | Age: 28
Discharge: HOME | End: 2023-03-22
Attending: PHYSICIAN ASSISTANT | Admitting: PHYSICIAN ASSISTANT
Payer: COMMERCIAL

## 2023-03-22 VITALS
BODY MASS INDEX: 24.84 KG/M2 | DIASTOLIC BLOOD PRESSURE: 59 MMHG | SYSTOLIC BLOOD PRESSURE: 115 MMHG | WEIGHT: 135 LBS | RESPIRATION RATE: 16 BRPM | HEIGHT: 62 IN | TEMPERATURE: 97.9 F | HEART RATE: 67 BPM | OXYGEN SATURATION: 97 %

## 2023-03-22 DIAGNOSIS — R68.84 JAW PAIN: ICD-10-CM

## 2023-03-22 PROCEDURE — 99213 OFFICE O/P EST LOW 20 MIN: CPT | Performed by: PHYSICIAN ASSISTANT

## 2023-03-22 PROCEDURE — G0463 HOSPITAL OUTPT CLINIC VISIT: HCPCS | Performed by: PHYSICIAN ASSISTANT

## 2023-03-22 RX ORDER — CEFDINIR 300 MG/1
300 CAPSULE ORAL 2 TIMES DAILY
Qty: 14 CAPSULE | Refills: 0 | Status: SHIPPED | OUTPATIENT
Start: 2023-03-22 | End: 2023-03-29

## 2023-03-22 RX ORDER — NAPROXEN 500 MG/1
500 TABLET ORAL 2 TIMES DAILY WITH MEALS
Qty: 14 TABLET | Refills: 0 | Status: SHIPPED | OUTPATIENT
Start: 2023-03-22 | End: 2023-03-29

## 2023-03-22 RX ORDER — CYCLOBENZAPRINE HCL 10 MG
5-10 TABLET ORAL
Qty: 20 TABLET | Refills: 0 | Status: SHIPPED | OUTPATIENT
Start: 2023-03-22 | End: 2023-03-28

## 2023-03-22 ASSESSMENT — ENCOUNTER SYMPTOMS: CONSTITUTIONAL NEGATIVE: 1

## 2023-03-22 NOTE — ED PROVIDER NOTES
History     Chief Complaint   Patient presents with     Jaw Pain     For 1 month, hurts to eat or sleep      HPI  Aidee Pope is a 27 year old female who presents with complaints of left-sided jaw pain for the past month.  Patient states her symptoms started upon awakening 1 month ago while on a trip to [place].  She states she happened to test positive for COVID-19 the following day.  She also had associated fatigue, congestion, and cough.  Patient states her left-sided jaw pain has persisted since that time.  The pain is worse with opening and closing her mouth and chewing food/eating.  Patient states she is starting to feel more swollen over the left cheek and into the left jaw joint.  Her left ear is slightly painful and feels full.  Patient denies any dental pain or symptoms.  No sinus or infectious symptoms currently.  She made an appointment for follow-up with her dentist on Monday.  No preceding injury or trauma.  She states she feels like her jaw is malaligned.  Denies fevers, chills, cough, sore throat, sinus pressure, nasal congestion, nausea, vomiting, diarrhea, abdominal pain, chest pain, or shortness of breath.      Allergies:  Allergies   Allergen Reactions     Amoxicillin Rash     Bee Venom      Wasp Venom Protein      Other reaction(s): Angioedema       Problem List:    Patient Active Problem List    Diagnosis Date Noted     S/P  section 2022     Priority: Medium     Velamentous insertion of umbilical cord in third trimester 2022     Priority: Medium     Placenta succenturiata in third trimester 2022     Priority: Medium     Prenatal care, first pregnancy 01/10/2022     Priority: Medium     Chronic tension-type headache, not intractable 2016     Priority: Medium     Generalized anxiety disorder 2011     Priority: Medium     Diagnosis updated by automated process. Provider to review and confirm.       Moderate major depression (H) 2011     Priority:  "Medium        Past Medical History:    Past Medical History:   Diagnosis Date     Chickenpox        Past Surgical History:    Past Surgical History:   Procedure Laterality Date      SECTION N/A 2022    Procedure:  SECTION;  Surgeon: Malena Tolliver MD;  Location: [facility] OR     WRIST FRACTURE SURGERY Right        Family History:    Family History   Problem Relation Age of Onset     Psychotic Disorder Father 52        suicide     Alcoholism Father      Alzheimer Disease Maternal Grandmother      Heart Disease Maternal Grandfather        Social History:  Marital Status:   [2]  Social History     Tobacco Use     Smoking status: Never     Smokeless tobacco: Never   Vaping Use     Vaping Use: Never used   Substance Use Topics     Alcohol use: Not Currently     Comment: occas-quit with pregnancy     Drug use: Not Currently     Types: Marijuana     Comment: quit with pregnancy        Medications:    cefdinir (OMNICEF) 300 MG capsule  cyclobenzaprine (FLEXERIL) 10 MG tablet  naproxen (NAPROSYN) 500 MG tablet  acetaminophen (TYLENOL) 500 MG tablet  ferrous sulfate (FEROSUL) 325 (65 Fe) MG tablet  ibuprofen (ADVIL/MOTRIN) 800 MG tablet  magnesium 250 MG tablet  oxyCODONE (ROXICODONE) 5 MG tablet  Prenatal Vit-Fe Fumarate-FA (PRENATAL MULTIVITAMIN W/IRON) 27-0.8 MG tablet  senna-docusate (SENOKOT-S/PERICOLACE) 8.6-50 MG tablet          Review of Systems   Constitutional: Negative.    HENT:        Left sided jaw pain   All other systems reviewed and are negative.      Physical Exam   BP: 115/59  Pulse: 67  Temp: 97.9  F (36.6  C)  Resp: 16  Height: 157.5 cm (5' 2\")  Weight: 61.2 kg (135 lb)  SpO2: 97 %      Physical Exam  Constitutional:       General: She is not in acute distress.     Appearance: Normal appearance. She is well-developed. She is not ill-appearing, toxic-appearing or diaphoretic.   HENT:      Head: Normocephalic and atraumatic.      Comments: Tenderness to palpation overlying " left TMJ.  No palpable clicking when patient opens and closes mouth     Right Ear: Tympanic membrane, ear canal and external ear normal.      Left Ear: Tympanic membrane, ear canal and external ear normal.      Nose: No congestion or rhinorrhea.      Left Sinus: Maxillary sinus tenderness present.      Comments: Tenderness overlying the left maxilla with mild overlying swelling and erythema     Mouth/Throat:      Lips: Pink.      Mouth: Mucous membranes are moist.      Dentition: No dental tenderness, gingival swelling, dental caries or dental abscesses.      Pharynx: Oropharynx is clear. Uvula midline. No pharyngeal swelling, oropharyngeal exudate, posterior oropharyngeal erythema or uvula swelling.      Tonsils: No tonsillar exudate or tonsillar abscesses.   Eyes:      Extraocular Movements: Extraocular movements intact.      Conjunctiva/sclera: Conjunctivae normal.      Pupils: Pupils are equal, round, and reactive to light.   Cardiovascular:      Rate and Rhythm: Normal rate and regular rhythm.      Heart sounds: Normal heart sounds.   Pulmonary:      Effort: Pulmonary effort is normal. No respiratory distress.      Breath sounds: Normal breath sounds. No stridor. No wheezing.   Musculoskeletal:         General: Normal range of motion.      Cervical back: Normal range of motion and neck supple. No rigidity.   Lymphadenopathy:      Cervical: No cervical adenopathy.   Skin:     General: Skin is warm and dry.   Neurological:      General: No focal deficit present.      Mental Status: She is alert and oriented to person, place, and time.      Cranial Nerves: No cranial nerve deficit.      Sensory: Sensation is intact.      Motor: Motor function is intact.      Coordination: Coordination is intact.   Psychiatric:         Behavior: Behavior is cooperative.         ED Course                 Procedures    No results found for this or any previous visit (from the past 24 hour(s)).    Medications - No data to  display    Assessments & Plan (with Medical Decision Making)     Pt is a 27 year old female who presents with complaints of left-sided jaw pain for the past month.  Patient states her symptoms started upon awakening 1 month ago while on a trip to [place].  Patient states her left-sided jaw pain has persisted since that time.  The pain is worse with opening and closing her mouth and chewing food/eating.  Patient states she is starting to feel more swollen over the left cheek and into the left jaw joint.  Her left ear is slightly painful and feels full.      Pt is afebrile on arrival.  Exam as above.  On exam, pt is noted to have tenderness to palpation overlying left TMJ.  No palpable clicking when patient opens and closes mouth. Also with tenderness overlying the left maxilla with mild overlying swelling and erythema.  We discussed that symptoms could be related to TMJ syndrome.  She has upcoming appt with her dentist on Monday.  Encouraged additional symptomatic treatments at home.  Will prescribe Naproxen and Flexeril (for nighttime).  Given her associated maxilla tenderness and mild overlying swelling, will also start antibiotics for coverage of possible dental infection.  Return precautions were reviewed.  Hand-outs were provided.    Patient was instructed to follow-up with dentist for continued care and management.  She is to return to the ED for persistent and/or worsening symptoms.  Patient expressed understanding of the diagnosis and plan and was discharged home in good condition.    I have reviewed the nursing notes.    I have reviewed the findings, diagnosis, plan and need for follow up with the patient.    Discharge Medication List as of 3/22/2023  2:57 PM      START taking these medications    Details   cefdinir (OMNICEF) 300 MG capsule Take 1 capsule (300 mg) by mouth 2 times daily for 7 days, Disp-14 capsule, R-0, E-Prescribe      cyclobenzaprine (FLEXERIL) 10 MG tablet Take 0.5-1 tablets (5-10 mg) by  mouth nightly as needed for muscle spasms, Disp-20 tablet, R-0, E-Prescribe      naproxen (NAPROSYN) 500 MG tablet Take 1 tablet (500 mg) by mouth 2 times daily (with meals) for 7 days, Disp-14 tablet, R-0, E-Prescribe             Final diagnoses:   Jaw pain       3/22/2023   [facility] EMERGENCY DEPT      Disclaimer:  This note consists of symbols derived from keyboarding, dictation and/or voice recognition software.  As a result, there may be errors in the script that have gone undetected.  Please consider this when interpreting information found in this chart.     Jaymie Murphy PA-C  03/22/23 1938

## 2023-03-22 NOTE — ED TRIAGE NOTES
Coming in with month long jaw pain.      Triage Assessment     Row Name 03/22/23 4120       Triage Assessment (Adult)    Airway WDL WDL       Respiratory WDL    Respiratory WDL WDL       Skin Circulation/Temperature WDL    Skin Circulation/Temperature WDL WDL       Cardiac WDL    Cardiac WDL WDL       Peripheral/Neurovascular WDL    Peripheral Neurovascular WDL WDL       Cognitive/Neuro/Behavioral WDL    Cognitive/Neuro/Behavioral WDL WDL

## 2023-04-09 ENCOUNTER — HEALTH MAINTENANCE LETTER (OUTPATIENT)
Age: 28
End: 2023-04-09

## 2023-11-08 ENCOUNTER — OFFICE VISIT (OUTPATIENT)
Dept: OBGYN | Facility: CLINIC | Age: 28
End: 2023-11-08
Payer: COMMERCIAL

## 2023-11-08 VITALS
HEIGHT: 62 IN | WEIGHT: 117 LBS | BODY MASS INDEX: 21.53 KG/M2 | HEART RATE: 65 BPM | SYSTOLIC BLOOD PRESSURE: 104 MMHG | DIASTOLIC BLOOD PRESSURE: 62 MMHG | RESPIRATION RATE: 18 BRPM | TEMPERATURE: 98.6 F

## 2023-11-08 DIAGNOSIS — M62.08 DIASTASIS OF RECTUS ABDOMINIS: Primary | ICD-10-CM

## 2023-11-08 PROCEDURE — 99213 OFFICE O/P EST LOW 20 MIN: CPT | Performed by: PHYSICIAN ASSISTANT

## 2023-11-08 ASSESSMENT — ENCOUNTER SYMPTOMS
ABDOMINAL DISTENTION: 0
MUSCULOSKELETAL NEGATIVE: 1
CONSTITUTIONAL NEGATIVE: 1
VOMITING: 0
HEMATOCHEZIA: 0
ABDOMINAL PAIN: 1
CONSTIPATION: 0
DIARRHEA: 0
NAUSEA: 0

## 2023-11-08 NOTE — NURSING NOTE
"Initial /62 (BP Location: Right arm, Patient Position: Chair, Cuff Size: Adult Regular)   Pulse 65   Temp 98.6  F (37  C) (Tympanic)   Resp 18   Ht 1.575 m (5' 2\")   Wt 53.1 kg (117 lb)   LMP 11/07/2023   BMI 21.40 kg/m   Estimated body mass index is 21.4 kg/m  as calculated from the following:    Height as of this encounter: 1.575 m (5' 2\").    Weight as of this encounter: 53.1 kg (117 lb). .    "

## 2023-11-08 NOTE — PROGRESS NOTES
"  Kim Caruso is a 27 year old, presenting for the following health issues:  Consult (Heavy periods and back pain. Pelvic cramping.)      HPI   Patient presents to the concerns for abdominal cramping with certain movements.  She had a  just over a year ago.  He sees healing well.  She states that with certain movements or sitting up she notices that she gets a right-sided abdominal wall spasm and cramping that takes a few seconds to go away.  She states that she feels like her core muscle abdominal walls are just not as strong as it used to be.  She also has noticed that her periods even though they are still very regular last about 5 days and she only has to change a tampon or pad every 5 hours are slightly heavier since having her baby compared to before.  She states prior to having her child she would have regular periods that were very light.  She states her last menstrual period was 23. She denies back pain, cramping, headache, vision changes with menses. She is not interested in birth control at this time, but is not sure if the change in her menses flow is normal.  She denies any fevers, chills, pelvic pain, urinary symptoms, bowel issues, vaginal irritation or discharge    Review of Systems   Constitutional: Negative.    HENT: Negative.     Gastrointestinal:  Positive for abdominal pain. Negative for abdominal distention, constipation, diarrhea, hematochezia, nausea and vomiting.   Breasts:  negative.    Genitourinary: Negative.         Heavier menses   Musculoskeletal: Negative.             Objective    /62 (BP Location: Right arm, Patient Position: Chair, Cuff Size: Adult Regular)   Pulse 65   Temp 98.6  F (37  C) (Tympanic)   Resp 18   Ht 1.575 m (5' 2\")   Wt 53.1 kg (117 lb)   LMP 2023   BMI 21.40 kg/m    Body mass index is 21.4 kg/m .  Physical Exam  Vitals reviewed.   Constitutional:       General: She is not in acute distress.     Appearance: Normal appearance. " She is normal weight. She is not ill-appearing or toxic-appearing.   Eyes:      General: No scleral icterus.     Pupils: Pupils are equal, round, and reactive to light.   Cardiovascular:      Pulses: Normal pulses.   Abdominal:      General: Abdomen is flat.      Palpations: Abdomen is soft. There is no mass.      Tenderness: There is no abdominal tenderness. There is no guarding or rebound.      Comments: 2 cm diastases of rectus abdominis muscles noted with no hernia present.  No mass, tenderness or bulging noted with palpation to the abdomen.   scar has healed well with no tenderness or concerns there.   Skin:     General: Skin is warm.      Capillary Refill: Capillary refill takes less than 2 seconds.   Neurological:      General: No focal deficit present.      Mental Status: She is alert and oriented to person, place, and time.   Psychiatric:         Mood and Affect: Mood normal.         Behavior: Behavior normal.         Thought Content: Thought content normal.         Judgment: Judgment normal.        ASSESSMENT/PLAN:     (M62.08) Diastasis of rectus abdominis  (primary encounter diagnosis)  Comment: slight diastasis of the rectus abdominis muscles with no hernia present. No tenderness or palpable mass. I suspect she is having abdominal muscle spasms/cramping that lasts several seconds with certain movements and I feel that strengthening the core and back will help with this.   Plan: Physical Therapy Referral        Patient given core strengthening muscle exercises and informed that heat, tylenol, and gentle massage can help with spasms.     Change in menses post partum:   Comment: Patient was reassured that at this time symptoms are normal menses with slightly heavier flow for patient, but still within the normal limits for normal menses. Patient reassured and does not want further work up at this time. Discussed and offered STD and vaginal swab to check for infection, TSH and CBC with platelet  labs, and pelvic US. Patient declined this work up at this time. She is also not interested in birth control at this time.   Patient to return as needed.     Shila Tan PA-C

## 2023-11-08 NOTE — PATIENT INSTRUCTIONS
I  put a physical therapy order in for you.   Below are a few abdominal core and back strength exercises to help with symptoms.   If cramping/muscle spasms continue heat, tylenol, gentle massage can help.      Deep core muscles activation     Lie down, sit, or stand. Or try this exercise on your hands and knees.  Pull in your belly. Imagine pulling your belly button back toward your spine.  While you pull in your belly, do a Kegel. To do a Kegel, squeeze your muscles as if you were trying not to pass gas. Or squeeze your muscles as if you were stopping the flow of urine.  Hold for 3 to 5 seconds, then release. Remember to keep breathing.  Repeat 10 times.  Pelvic tilt with heel slide     Lie on your back with your left knee straight. Your right knee should be bent.  Tighten your belly muscles. Pull your belly button in toward your spine. You should feel your hips and pelvis rock back. Try to keep the muscles tight while you do the exercise.  Bend your left knee by sliding your heel across the floor and toward your buttock.  Repeat 8 to 12 times on each leg.  Standing plank on a wall     Stand tall and face a wall.  Place both hands on the wall with your palms flat. Your arms should be straight and at shoulder level.  Pull in your belly. Imagine pulling your belly button back toward your spine.  Step your feet back about 12 inches from the wall. Keep your body in a straight line and your shoulders relaxed.  Hold for 3 to 5 seconds. Remember to keep breathing.  Step forward to the starting position and take a deep breath.  Repeat 8 to 12 times.  You can make this exercise harder by stepping back farther. Remember to step back just far enough to feel a gentle challenge in your belly muscles.  Current as of: July 11, 2023               Content Version: 13.8    [id number] [employer].   Care instructions adapted under license by your healthcare professional. If you have questions about a medical condition or  this instruction, always ask your healthcare professional. [employer] disclaims any warranty or liability for your use of this information.

## 2024-04-11 ENCOUNTER — LAB (OUTPATIENT)
Dept: LAB | Facility: CLINIC | Age: 29
End: 2024-04-11
Attending: PREVENTIVE MEDICINE
Payer: COMMERCIAL

## 2024-04-11 ENCOUNTER — E-VISIT (OUTPATIENT)
Dept: URGENT CARE | Facility: CLINIC | Age: 29
End: 2024-04-11
Payer: COMMERCIAL

## 2024-04-11 ENCOUNTER — HOSPITAL ENCOUNTER (EMERGENCY)
Facility: CLINIC | Age: 29
Discharge: HOME | End: 2024-04-11
Payer: COMMERCIAL

## 2024-04-11 VITALS
DIASTOLIC BLOOD PRESSURE: 77 MMHG | OXYGEN SATURATION: 100 % | RESPIRATION RATE: 16 BRPM | TEMPERATURE: 99.5 F | SYSTOLIC BLOOD PRESSURE: 118 MMHG | HEART RATE: 93 BPM

## 2024-04-11 DIAGNOSIS — J02.9 SORE THROAT: ICD-10-CM

## 2024-04-11 DIAGNOSIS — J02.9 SORE THROAT: Primary | ICD-10-CM

## 2024-04-11 DIAGNOSIS — J06.9 VIRAL URI WITH COUGH: ICD-10-CM

## 2024-04-11 LAB
DEPRECATED S PYO AG THROAT QL EIA: NEGATIVE
FLUAV AG SPEC QL IA: NEGATIVE
FLUBV AG SPEC QL IA: NEGATIVE
GROUP A STREP BY PCR: NOT DETECTED

## 2024-04-11 PROCEDURE — 87635 SARS-COV-2 COVID-19 AMP PRB: CPT

## 2024-04-11 PROCEDURE — 99213 OFFICE O/P EST LOW 20 MIN: CPT

## 2024-04-11 PROCEDURE — 99207 PR NO BILLABLE SERVICE THIS VISIT: CPT | Performed by: PREVENTIVE MEDICINE

## 2024-04-11 PROCEDURE — 87651 STREP A DNA AMP PROBE: CPT

## 2024-04-11 PROCEDURE — G0463 HOSPITAL OUTPT CLINIC VISIT: HCPCS

## 2024-04-11 PROCEDURE — 87804 INFLUENZA ASSAY W/OPTIC: CPT

## 2024-04-11 RX ORDER — BENZONATATE 100 MG/1
100 CAPSULE ORAL 3 TIMES DAILY PRN
Qty: 21 CAPSULE | Refills: 0 | Status: SHIPPED | OUTPATIENT
Start: 2024-04-11

## 2024-04-11 ASSESSMENT — ENCOUNTER SYMPTOMS
COUGH: 1
NAUSEA: 0
SORE THROAT: 1
ABDOMINAL DISTENTION: 0
CHEST TIGHTNESS: 0
VOICE CHANGE: 0
TROUBLE SWALLOWING: 0
FATIGUE: 0
FEVER: 0
DIARRHEA: 0
CHOKING: 0
ACTIVITY CHANGE: 0
SHORTNESS OF BREATH: 0
WHEEZING: 0
RHINORRHEA: 0
CHILLS: 0

## 2024-04-11 ASSESSMENT — COLUMBIA-SUICIDE SEVERITY RATING SCALE - C-SSRS
2. HAVE YOU ACTUALLY HAD ANY THOUGHTS OF KILLING YOURSELF IN THE PAST MONTH?: NO
1. IN THE PAST MONTH, HAVE YOU WISHED YOU WERE DEAD OR WISHED YOU COULD GO TO SLEEP AND NOT WAKE UP?: NO
6. HAVE YOU EVER DONE ANYTHING, STARTED TO DO ANYTHING, OR PREPARED TO DO ANYTHING TO END YOUR LIFE?: NO

## 2024-04-11 NOTE — ED TRIAGE NOTES
Ear fullness, cough & chest congestion x 3 days   Did a virtual appt and a lab appt earlier today, was negative for strep, FLU A&B, COVID is still pending.

## 2024-04-11 NOTE — ED PROVIDER NOTES
History     Chief Complaint   Patient presents with    Ear Fullness    Cough     HPI  Aidee Pope is a 28 year old female who presents for evaluation of 3 days of cough, congestion, and sore throat that began 3 days ago.  Reports that her son was sick with strep and she has been taking care of him.  She has been trying DayQuil and TheraFlu for her symptoms.  She denies any fever, chills, shortness of breath or difficulty breathing, abdominal pain or nausea.  She is eating and drinking normally.     She did have a virtual appointment today and tested negative for strep and influenza A/B, COVID-19 test still pending.    Allergies:  Allergies   Allergen Reactions    Amoxicillin Rash    Bee Venom     Wasp Venom Protein      Other reaction(s): Angioedema       Problem List:    Patient Active Problem List    Diagnosis Date Noted    S/P  section 2022     Priority: Medium    Velamentous insertion of umbilical cord in third trimester 2022     Priority: Medium    Placenta succenturiata in third trimester 2022     Priority: Medium    Prenatal care, first pregnancy 01/10/2022     Priority: Medium    Chronic tension-type headache, not intractable 2016     Priority: Medium    Generalized anxiety disorder 2011     Priority: Medium     Diagnosis updated by automated process. Provider to review and confirm.      Moderate major depression (H) 2011     Priority: Medium        Past Medical History:    Past Medical History:   Diagnosis Date    Chickenpox        Past Surgical History:    Past Surgical History:   Procedure Laterality Date     SECTION N/A 2022    Procedure:  SECTION;  Surgeon: Malena Tolliver MD;  Location: [facility]    WRIST FRACTURE SURGERY Right        Family History:    Family History   Problem Relation Age of Onset    Psychotic Disorder Father 52        suicide    Alcoholism Father     Alzheimer Disease Maternal Grandmother     Heart  Disease Maternal Grandfather        Social History:  Marital Status:   [2]  Social History     Tobacco Use    Smoking status: Never    Smokeless tobacco: Never   Vaping Use    Vaping status: Never Used   Substance Use Topics    Alcohol use: Not Currently     Comment: occas-quit with pregnancy    Drug use: Not Currently     Types: Marijuana     Comment: quit with pregnancy        Medications:    benzonatate (TESSALON) 100 MG capsule  acetaminophen (TYLENOL) 500 MG tablet  ferrous sulfate (FEROSUL) 325 (65 Fe) MG tablet  ibuprofen (ADVIL/MOTRIN) 800 MG tablet  magnesium 250 MG tablet  oxyCODONE (ROXICODONE) 5 MG tablet  Prenatal Vit-Fe Fumarate-FA (PRENATAL MULTIVITAMIN W/IRON) 27-0.8 MG tablet  senna-docusate (SENOKOT-S/PERICOLACE) 8.6-50 MG tablet          Review of Systems   Constitutional:  Negative for activity change, chills, fatigue and fever.   HENT:  Positive for congestion and sore throat. Negative for ear discharge, ear pain, rhinorrhea, trouble swallowing and voice change.    Respiratory:  Positive for cough. Negative for choking, chest tightness, shortness of breath and wheezing.    Cardiovascular:  Negative for chest pain.   Gastrointestinal:  Negative for abdominal distention, diarrhea and nausea.   Skin:  Negative for rash.       Physical Exam   BP: 118/77  Pulse: 93  Temp: 99.5  F (37.5  C)  Resp: 16  SpO2: 100 %      Physical Exam  Vitals and nursing note reviewed.   Constitutional:       General: She is not in acute distress.     Appearance: Normal appearance. She is normal weight. She is not ill-appearing, toxic-appearing or diaphoretic.   HENT:      Head: Normocephalic and atraumatic.      Right Ear: Tympanic membrane, ear canal and external ear normal.      Left Ear: Tympanic membrane, ear canal and external ear normal.      Nose: Congestion present.      Mouth/Throat:      Mouth: Mucous membranes are moist.      Pharynx: No oropharyngeal exudate or posterior oropharyngeal erythema.    Eyes:      Extraocular Movements: Extraocular movements intact.      Conjunctiva/sclera: Conjunctivae normal.   Cardiovascular:      Rate and Rhythm: Normal rate and regular rhythm.      Heart sounds: Normal heart sounds.   Pulmonary:      Effort: Pulmonary effort is normal. No respiratory distress.      Breath sounds: Normal breath sounds. No wheezing, rhonchi or rales.   Abdominal:      General: Abdomen is flat. Bowel sounds are normal. There is no distension.      Palpations: Abdomen is soft.      Tenderness: There is no abdominal tenderness. There is no right CVA tenderness, left CVA tenderness or guarding.   Musculoskeletal:      Cervical back: Normal range of motion. No rigidity or tenderness.   Lymphadenopathy:      Cervical: No cervical adenopathy.   Skin:     General: Skin is warm and dry.      Capillary Refill: Capillary refill takes less than 2 seconds.   Neurological:      General: No focal deficit present.      Mental Status: She is alert and oriented to person, place, and time.   Psychiatric:         Mood and Affect: Mood normal.         Behavior: Behavior normal.         ED Course       Results for orders placed or performed in visit on 04/11/24 (from the past 24 hour(s))   Influenza A & B Antigen (Clinic collect)    Specimen: Nose; Swab   Result Value Ref Range    Influenza A antigen Negative Negative    Influenza B antigen Negative Negative    Narrative    Test results must be correlated with clinical data. If necessary, results should be confirmed by a molecular assay or viral culture.   Streptococcus A Rapid Screen w/Reflex to PCR    Specimen: Throat; Swab   Result Value Ref Range    Group A Strep antigen Negative Negative   Group A Streptococcus PCR Throat Swab    Specimen: Throat; Swab   Result Value Ref Range    Group A strep by PCR Not Detected Not Detected    Narrative    The Xpert Xpress Strep A test, performed on the [employer], is a rapid, qualitative in vitro  diagnostic test for the detection of Streptococcus pyogenes (Group A ß-hemolytic Streptococcus, Strep A) in throat swab specimens from patients with signs and symptoms of pharyngitis. The Xpert Xpress Strep A test can be used as an aid in the diagnosis of Group A Streptococcal pharyngitis. The assay is not intended to monitor treatment for Group A Streptococcus infections. The Xpert Xpress Strep A test utilizes an automated real-time polymerase chain reaction (PCR) to detect Streptococcus pyogenes DNA.       Medications - No data to display    Assessments & Plan (with Medical Decision Making)     28-year-old female who presents for evaluation of cough, congestion, and sore throat that began 3 days ago. Reports that her son is currently sick with strep.  She has been trying DayQuil and TheraFlu for symptoms.    On exam, she is well-appearing and in no acute distress, she has low-grade fever of 99.5  F with other vital signs within normal limits.  There is congestion present.  Lungs CTAB.  No posterior oropharyngeal erythema or exudate, no peritonsillar abscess appreciated.  The rest of the physical exam is benign, see above for details.  Testing was negative for influenza A/B, and group A strep, COVID-19 test still pending, she will be contacted with the results.  Suspect that her symptoms are related to viral illness.  Discussed findings with the patient and recommend supportive treatment with Tylenol and ibuprofen as needed for fever reduction.  Also sent prescription for Tessalon capsules as needed for cough.  Advised that if her symptoms do not improve despite the recommended treatment, or if she develops any new or worsening symptoms, that she should return for further evaluation.  All questions answered.  Patient verbalizes understanding and agreement with the above plan.    I have reviewed the nursing notes.    I have reviewed the findings, diagnosis, plan and need for follow up with the  patient.      Discharge Medication List as of 4/11/2024  3:42 PM        START taking these medications    Details   benzonatate (TESSALON) 100 MG capsule Take 1 capsule (100 mg) by mouth 3 times daily as needed for cough, Disp-21 capsule, R-0, E-Prescribe             Final diagnoses:   Viral URI with cough       4/11/2024   [facility] EMERGENCY DEPT       Renee Vasquez PA-C  04/11/24 [id number]

## 2024-04-11 NOTE — DISCHARGE INSTRUCTIONS
You were seen today for cough, congestion, and sore throat.  Your testing was negative for strep and influenza A/B.  I suspect that your symptoms are related to a viral upper respiratory illness.  I have sent a prescription for Tessalon capsules, you may take up to 3 times daily to help with cough.  I also recommended that you take Tylenol and ibuprofen as needed for fever and pain.  If your symptoms do not improve despite the recommended treatment, or if you develop any new or worsening symptoms, then please return for further evaluation.

## 2024-04-11 NOTE — PATIENT INSTRUCTIONS
Dear Zuly,    After reviewing your responses, I would like you to come in for a swab to make sure we treat you correctly. This swab is to evaluate you for possible COVID and Strep Throat, and influenza should be scheduled for today or tomorrow. Please use the Schedule Now button in [employer] to schedule your swab. Otherwise, click this link to schedule a lab only appointment.    Lab appointments are not available at most locations on the weekends. If no Lab Only appointment is available, you should be seen in any of our convenient Urgent Care Centers for an in person visit, which can be found on our website here.    You will receive instructions with your results in [facility] once they are available.     If your symptoms worsen, you develop difficulty breathing, difficulty with drinking enough to stay hydrated, difficulty swallowing your saliva or have fevers for more than 5 days, please contact your primary care provider for an appointment or visit an Urgent Care Center to be seen.      Thanks again for choosing us as your health care partner.   Kaushal Roberts MD, MD  Sore Throat: Care Instructions  Overview     Infection by bacteria or a virus causes most sore throats. Cigarette smoke, dry air, air pollution, allergies, and yelling can also cause a sore throat. Sore throats can be painful and annoying. Fortunately, most sore throats go away on their own. If you have a bacterial infection, your doctor may prescribe antibiotics.  Follow-up care is a key part of your treatment and safety. Be sure to make and go to all appointments, and call your doctor if you are having problems. It's also a good idea to know your test results and keep a list of the medicines you take.  How can you care for yourself at home?  If your doctor prescribed antibiotics, take them as directed. Do not stop taking them just because you feel better. You need to take the full course of antibiotics.  Gargle with warm salt water  "several times a day to help reduce swelling and relieve pain. Mix 1/2 teaspoon of salt in 1 cup of warm water.  Take an over-the-counter pain medicine, such as acetaminophen (Tylenol), ibuprofen (Advil, Motrin), or naproxen (Aleve). Read and follow all instructions on the label.  Be careful when taking over-the-counter cold or flu medicines and Tylenol at the same time. Many of these medicines have acetaminophen, which is Tylenol. Read the labels to make sure that you are not taking more than the recommended dose. Too much acetaminophen (Tylenol) can be harmful.  Drink plenty of fluids. Fluids may help soothe an irritated throat. Hot fluids, such as tea or soup, may help decrease throat pain.  Use over-the-counter throat lozenges to soothe pain. Regular cough drops or hard candy may also help. These should not be given to young children because of the risk of choking.  Do not smoke or allow others to smoke around you. If you need help quitting, talk to your doctor about stop-smoking programs and medicines. These can increase your chances of quitting for good.  Use a vaporizer or humidifier to add moisture to your bedroom. Follow the directions for cleaning the machine.  When should you call for help?   Call your doctor now or seek immediate medical care if:    You have trouble breathing.     Your sore throat gets much worse on one side.     You have new or worse trouble swallowing.     You have a new or higher fever.   Watch closely for changes in your health, and be sure to contact your doctor if you do not get better as expected.  Where can you learn more?  Go to https://www.[employer].net/patiented  Enter U420 in the search box to learn more about \"Sore Throat: Care Instructions.\"  Current as of: September 27, 2023               Content Version: 14.0    7833-3342 [employer].   Care instructions adapted under license by your healthcare professional. If you have questions about a medical condition or " this instruction, always ask your healthcare professional. [employer] disclaims any warranty or liability for your use of this information.

## 2024-04-12 LAB — SARS-COV-2 RNA RESP QL NAA+PROBE: NEGATIVE

## 2024-04-24 ENCOUNTER — OFFICE VISIT (OUTPATIENT)
Dept: FAMILY MEDICINE | Facility: CLINIC | Age: 29
End: 2024-04-24
Payer: COMMERCIAL

## 2024-04-24 ENCOUNTER — ANCILLARY PROCEDURE (OUTPATIENT)
Dept: GENERAL RADIOLOGY | Facility: CLINIC | Age: 29
End: 2024-04-24
Attending: NURSE PRACTITIONER
Payer: COMMERCIAL

## 2024-04-24 VITALS
TEMPERATURE: 98.7 F | DIASTOLIC BLOOD PRESSURE: 68 MMHG | SYSTOLIC BLOOD PRESSURE: 110 MMHG | BODY MASS INDEX: 19.88 KG/M2 | HEART RATE: 105 BPM | OXYGEN SATURATION: 99 % | RESPIRATION RATE: 12 BRPM | WEIGHT: 108 LBS | HEIGHT: 62 IN

## 2024-04-24 DIAGNOSIS — F41.1 GENERALIZED ANXIETY DISORDER: ICD-10-CM

## 2024-04-24 DIAGNOSIS — F33.1 MODERATE EPISODE OF RECURRENT MAJOR DEPRESSIVE DISORDER (H): ICD-10-CM

## 2024-04-24 DIAGNOSIS — R63.4 WEIGHT LOSS: ICD-10-CM

## 2024-04-24 DIAGNOSIS — R05.1 ACUTE COUGH: Primary | ICD-10-CM

## 2024-04-24 DIAGNOSIS — R05.1 ACUTE COUGH: ICD-10-CM

## 2024-04-24 LAB
ALBUMIN SERPL BCG-MCNC: 4.3 G/DL (ref 3.5–5.2)
ALP SERPL-CCNC: 50 U/L (ref 40–150)
ALT SERPL W P-5'-P-CCNC: 18 U/L (ref 0–50)
ANION GAP SERPL CALCULATED.3IONS-SCNC: 7 MMOL/L (ref 7–15)
AST SERPL W P-5'-P-CCNC: 21 U/L (ref 0–45)
BILIRUB SERPL-MCNC: 0.7 MG/DL
BUN SERPL-MCNC: 13.8 MG/DL (ref 6–20)
CALCIUM SERPL-MCNC: 9.1 MG/DL (ref 8.6–10)
CHLORIDE SERPL-SCNC: 107 MMOL/L (ref 98–107)
CREAT SERPL-MCNC: 0.73 MG/DL (ref 0.51–0.95)
DEPRECATED HCO3 PLAS-SCNC: 28 MMOL/L (ref 22–29)
EGFRCR SERPLBLD CKD-EPI 2021: >90 ML/MIN/1.73M2
ERYTHROCYTE [DISTWIDTH] IN BLOOD BY AUTOMATED COUNT: 12.3 % (ref 10–15)
GLUCOSE SERPL-MCNC: 93 MG/DL (ref 70–99)
HCT VFR BLD AUTO: 39.4 % (ref 35–47)
HGB BLD-MCNC: 12.7 G/DL (ref 11.7–15.7)
MCH RBC QN AUTO: 29.9 PG (ref 26.5–33)
MCHC RBC AUTO-ENTMCNC: 32.2 G/DL (ref 31.5–36.5)
MCV RBC AUTO: 93 FL (ref 78–100)
PLATELET # BLD AUTO: 410 10E3/UL (ref 150–450)
POTASSIUM SERPL-SCNC: 4.4 MMOL/L (ref 3.4–5.3)
PROT SERPL-MCNC: 7.2 G/DL (ref 6.4–8.3)
RBC # BLD AUTO: 4.25 10E6/UL (ref 3.8–5.2)
SODIUM SERPL-SCNC: 142 MMOL/L (ref 135–145)
TSH SERPL DL<=0.005 MIU/L-ACNC: 0.85 UIU/ML (ref 0.3–4.2)
WBC # BLD AUTO: 11 10E3/UL (ref 4–11)

## 2024-04-24 PROCEDURE — 99204 OFFICE O/P NEW MOD 45 MIN: CPT | Performed by: NURSE PRACTITIONER

## 2024-04-24 PROCEDURE — 71046 X-RAY EXAM CHEST 2 VIEWS: CPT | Mod: TC | Performed by: RADIOLOGY

## 2024-04-24 PROCEDURE — 85027 COMPLETE CBC AUTOMATED: CPT | Performed by: NURSE PRACTITIONER

## 2024-04-24 PROCEDURE — 80053 COMPREHEN METABOLIC PANEL: CPT | Performed by: NURSE PRACTITIONER

## 2024-04-24 PROCEDURE — 84443 ASSAY THYROID STIM HORMONE: CPT | Performed by: NURSE PRACTITIONER

## 2024-04-24 PROCEDURE — 36415 COLL VENOUS BLD VENIPUNCTURE: CPT | Performed by: NURSE PRACTITIONER

## 2024-04-24 RX ORDER — ESCITALOPRAM OXALATE 10 MG/1
10 TABLET ORAL DAILY
Qty: 30 TABLET | Refills: 1 | Status: SHIPPED | OUTPATIENT
Start: 2024-04-24

## 2024-04-24 ASSESSMENT — ANXIETY QUESTIONNAIRES
7. FEELING AFRAID AS IF SOMETHING AWFUL MIGHT HAPPEN: MORE THAN HALF THE DAYS
GAD7 TOTAL SCORE: 9
IF YOU CHECKED OFF ANY PROBLEMS ON THIS QUESTIONNAIRE, HOW DIFFICULT HAVE THESE PROBLEMS MADE IT FOR YOU TO DO YOUR WORK, TAKE CARE OF THINGS AT HOME, OR GET ALONG WITH OTHER PEOPLE: SOMEWHAT DIFFICULT
7. FEELING AFRAID AS IF SOMETHING AWFUL MIGHT HAPPEN: MORE THAN HALF THE DAYS
3. WORRYING TOO MUCH ABOUT DIFFERENT THINGS: SEVERAL DAYS
1. FEELING NERVOUS, ANXIOUS, OR ON EDGE: SEVERAL DAYS
GAD7 TOTAL SCORE: 9
8. IF YOU CHECKED OFF ANY PROBLEMS, HOW DIFFICULT HAVE THESE MADE IT FOR YOU TO DO YOUR WORK, TAKE CARE OF THINGS AT HOME, OR GET ALONG WITH OTHER PEOPLE?: SOMEWHAT DIFFICULT
6. BECOMING EASILY ANNOYED OR IRRITABLE: MORE THAN HALF THE DAYS
4. TROUBLE RELAXING: SEVERAL DAYS
5. BEING SO RESTLESS THAT IT IS HARD TO SIT STILL: SEVERAL DAYS
2. NOT BEING ABLE TO STOP OR CONTROL WORRYING: SEVERAL DAYS
GAD7 TOTAL SCORE: 9

## 2024-04-24 ASSESSMENT — PAIN SCALES - GENERAL: PAINLEVEL: MODERATE PAIN (4)

## 2024-04-24 ASSESSMENT — PATIENT HEALTH QUESTIONNAIRE - PHQ9
SUM OF ALL RESPONSES TO PHQ QUESTIONS 1-9: 8
SUM OF ALL RESPONSES TO PHQ QUESTIONS 1-9: 8
10. IF YOU CHECKED OFF ANY PROBLEMS, HOW DIFFICULT HAVE THESE PROBLEMS MADE IT FOR YOU TO DO YOUR WORK, TAKE CARE OF THINGS AT HOME, OR GET ALONG WITH OTHER PEOPLE: SOMEWHAT DIFFICULT

## 2024-04-24 NOTE — PROGRESS NOTES
Assessment & Plan     Acute cough  Likely viral.  Has had multiple viral URIs due to son being in .  Chest x-ray was normal today and patient was reassured.  - XR Chest 2 Views; Future    Moderate episode of recurrent major depressive disorder (H)  Poorly controlled.  Encourage counseling and patient is agreeable.  Start Lexapro 10 mg daily.  Follow-up in 1 month.  - Adult Mental Health  Referral; Future  - escitalopram (LEXAPRO) 10 MG tablet; Take 1 tablet (10 mg) by mouth daily    Generalized anxiety disorder  Poorly controlled.  Encourage counseling and patient is agreeable.  Start Lexapro 10 mg daily.  Follow-up in 1 month.  - Adult Mental Health  Referral; Future  - escitalopram (LEXAPRO) 10 MG tablet; Take 1 tablet (10 mg) by mouth daily    Weight loss  Unintentional weight loss.  Likely multifactorial, including alcohol cessation, anxiety, neglect of self-care.  Discussed adequate caloric intake.  Addressing anxiety as above.  Congratulated her on her sobriety.  Will obtain labs today:  - CBC with platelets; Future  - Comprehensive metabolic panel (BMP + Alb, Alk Phos, ALT, AST, Total. Bili, TP); Future  - TSH with free T4 reflex; Future  Offered dietitian consult and she declined.      The risks, benefits and treatment options of prescribed medications or other treatments have been discussed with the patient. The patient verbalized their understanding and should call or follow up if no improvement or if they develop further problems.  Belkis Proctor, CNP              Subjective   Zuly is a 28 year old, presenting for the following health issues:  ER F/U, Anxiety, and Musculoskeletal Problem (Upper right back pain)        4/24/2024     2:04 PM   Additional Questions   Roomed by Arcelia ARMENTA CMA   Accompanied by self         4/24/2024     2:04 PM   Patient Reported Additional Medications   Patient reports taking the following new medications none     History of Present Illness        Back Pain:  She presents for follow up of back pain. Patient's back pain is a recurring problem.  Location of back pain:  Right middle of back, right upper back and right shoulder  Description of back pain: burning and other  Back pain spreads: right shoulder and right side of neck    Since patient first noticed back pain, pain is: always present, but gets better and worse  Does back pain interfere with her job:  Yes       Mental Health Follow-up:  Patient presents to follow-up on Anxiety.    Patient's anxiety since last visit has been:  Worse  The patient is having other symptoms associated with anxiety.  Any significant life events: health concerns  Patient is feeling anxious or having panic attacks.  Patient has concerns about alcohol or drug use.    Reason for visit:  Look at chest xray to determine if theres something underlying causing my symptoms    She eats 0-1 servings of fruits and vegetables daily.She consumes 1 sweetened beverage(s) daily.She exercises with enough effort to increase her heart rate 60 or more minutes per day.  She exercises with enough effort to increase her heart rate 5 days per week. She is missing 7 dose(s) of medications per week.  She is not taking prescribed medications regularly due to side effects.    Has tried several SSRIs in the past - always feels like they make her numb           ED/UC Followup:    Facility:  [facility]  Date of visit: 4/11/24  Reason for visit: cough  Viral uri with cough  Current Status: patient states she is still having symptom of pneumonia- productive cough and right upper back pain.  Would maybe like to discuss xray  States that since having her baby, she is sick all the time (as is the baby).  Baby is in .        Reports unintentional weight loss and inability to gain weight.  Has a 19 months old son.  Both patient and her  had a drinking problem - they quit drinking 7 months ago. She thought the weight loss was due to quitting drinking  "at first, but not isn't sure.  Was never this thin - not even in high school.  She isn't sure that she is eating enough - states that she puts her own eating on the back-burner.    Wt Readings from Last 4 Encounters:   04/24/24 49 kg (108 lb)   11/08/23 53.1 kg (117 lb)   03/22/23 61.2 kg (135 lb)   10/10/22 70.1 kg (154 lb 9.6 oz)             Review of Systems  Constitutional, HEENT, cardiovascular, pulmonary, gi and gu systems are negative, except as otherwise noted.      Objective    /68 (BP Location: Right arm, Patient Position: Sitting, Cuff Size: Adult Regular)   Pulse 105   Temp 98.7  F (37.1  C) (Tympanic)   Resp 12   Ht 1.575 m (5' 2\")   Wt 49 kg (108 lb)   LMP 03/27/2024 (Approximate)   SpO2 99%   Breastfeeding No   BMI 19.75 kg/m    Body mass index is 19.75 kg/m .  Physical Exam   GENERAL: alert and no distress  NECK: no adenopathy, no asymmetry, masses, or scars  RESP: lungs clear to auscultation - no rales, rhonchi or wheezes  CV: regular rate and rhythm, normal S1 S2, no S3 or S4, no murmur, click or rub, no peripheral edema  MS: no gross musculoskeletal defects noted, no edema  PSYCH: mentation appears normal, tearful, and anxious            Signed Electronically by: FARA Dill CNP    "

## 2024-06-16 ENCOUNTER — HEALTH MAINTENANCE LETTER (OUTPATIENT)
Age: 29
End: 2024-06-16

## 2024-12-05 ENCOUNTER — OFFICE VISIT (OUTPATIENT)
Dept: FAMILY MEDICINE | Facility: CLINIC | Age: 29
End: 2024-12-05
Payer: COMMERCIAL

## 2024-12-05 VITALS
WEIGHT: 117 LBS | SYSTOLIC BLOOD PRESSURE: 115 MMHG | DIASTOLIC BLOOD PRESSURE: 75 MMHG | HEART RATE: 68 BPM | TEMPERATURE: 98.8 F | RESPIRATION RATE: 12 BRPM | OXYGEN SATURATION: 100 % | BODY MASS INDEX: 21.53 KG/M2 | HEIGHT: 62 IN

## 2024-12-05 DIAGNOSIS — M62.08 DIASTASIS RECTI: ICD-10-CM

## 2024-12-05 DIAGNOSIS — R10.2 PELVIC PAIN IN FEMALE: Primary | ICD-10-CM

## 2024-12-05 ASSESSMENT — PATIENT HEALTH QUESTIONNAIRE - PHQ9
SUM OF ALL RESPONSES TO PHQ QUESTIONS 1-9: 2
10. IF YOU CHECKED OFF ANY PROBLEMS, HOW DIFFICULT HAVE THESE PROBLEMS MADE IT FOR YOU TO DO YOUR WORK, TAKE CARE OF THINGS AT HOME, OR GET ALONG WITH OTHER PEOPLE: NOT DIFFICULT AT ALL
SUM OF ALL RESPONSES TO PHQ QUESTIONS 1-9: 2

## 2024-12-05 NOTE — PROGRESS NOTES
Assessment & Plan   Assessment & Plan  Pelvic pain in female  Patient notes changes in cycle with the birth of her child two years ago including including irregularity, intermittent pelvic pain and menorrhagia. She's had normal blood work up until this point. She's most concerned as she plans to become pregnant in the near future; offered pelvic ultrasound to evaluate structures but discussed these can be normal changes. Order placed and will follow up on results.  Orders:    Physical Therapy  Referral; Future    US Pelvic Complete with Transvaginal; Future    Diastasis recti  Patient presents today with concerns for abdominal spasms and 'sinking' that occurs with flexion of the abdomen and twisting. She also endorses some intermittent constipation and increased urinary frequency, all occurring since the birth of her child two years ago. She has no alarm findings and discomfort is not overt pain and always very fleeting. On exam, I able to palpate about 1.5 fingerwidths separation in the abdomen. Discussed this fits with her observation of 'sinking' and pulsating. Given onset of symptoms after childbirth, I'm apt to believe there is some pelvic floor dysfunction contributing given concurrent symptoms of constipation and urinary frequency. We discussed diastasis recti is only fixed surgically if significant and symptomatic, which I would not consider hers to be. I suggest pelvic floor therapy to address and return to clinic if symptoms are not improving with planned treatment. Patient expressed understanding of and agreement with this plan. All questions were answered.  Orders:    Physical Therapy  Referral; Future    Subjective   Zuly is a 28 year old, presenting for the following health issues:  Gastric Problem (Stomach cramping with certain positions. Sx for 2 years off and on since giving birth to son.)        12/5/2024    10:47 AM   Additional Questions   Roomed by sac   Accompanied by self  "        12/5/2024    10:47 AM   Patient Reported Additional Medications   Patient reports taking the following new medications no     OV to discuss abdominal cramping  Present for the last 2 years since the birth of her son  Notes that with certain positions, she will have 'pulling in' of the stomach with 'bubbling.'  Her biggest thing is that she hopes to get pregnant this spring and wants to verify everything is fine.   Happening once a day. Again, notices it with specific positions (abdominal crunching/twisting). She also noticed symptoms were exacerbated with coughing.   No nausea, vomiting.   Some intermittent constipation and sensations of needing to have a bowel movement but unable to do so. Some increased urinary frequency, but feels like she drinks a lot of caffeine.   Also, since the birth of her child she has noticed changes to her menstrual cycle. They seem exaggerated - more cramping, increased fatigue.  Intermittent pelvic pain.    History of Present Illness       Reason for visit:  Stomach cramping    She eats 2-3 servings of fruits and vegetables daily.She consumes 2 sweetened beverage(s) daily.She exercises with enough effort to increase her heart rate 30 to 60 minutes per day.  She exercises with enough effort to increase her heart rate 5 days per week. She is missing 7 dose(s) of medications per week.  She is not taking prescribed medications regularly due to side effects and other.       Objective    /75 (BP Location: Left arm, Patient Position: Sitting, Cuff Size: Adult Regular)   Pulse 68   Temp 98.8  F (37.1  C) (Oral)   Resp 12   Ht 1.575 m (5' 2\")   Wt 53.1 kg (117 lb)   LMP 11/28/2024 (Exact Date)   SpO2 100%   BMI 21.40 kg/m    Body mass index is 21.4 kg/m .    Physical Exam  Vitals and nursing note reviewed.   Constitutional:       General: She is not in acute distress.     Appearance: Normal appearance.   Cardiovascular:      Rate and Rhythm: Normal rate and regular rhythm. "   Pulmonary:      Effort: Pulmonary effort is normal. No respiratory distress.   Abdominal:      General: Abdomen is flat. There is no distension.      Palpations: Abdomen is soft.      Tenderness: There is no abdominal tenderness. There is no guarding.      Comments: 1.5 fingerwidth separation of  palpated rectus abdominal muscles   Neurological:      Mental Status: She is alert.   Psychiatric:         Mood and Affect: Mood normal.         Behavior: Behavior normal.         Thought Content: Thought content normal.            Signed Electronically by: FARA Franklin CNP

## 2024-12-16 ENCOUNTER — HOSPITAL ENCOUNTER (OUTPATIENT)
Dept: ULTRASOUND IMAGING | Facility: CLINIC | Age: 29
Discharge: HOME | End: 2024-12-16
Payer: COMMERCIAL

## 2024-12-16 DIAGNOSIS — R10.2 PELVIC PAIN IN FEMALE: ICD-10-CM

## 2024-12-16 PROCEDURE — 76856 US EXAM PELVIC COMPLETE: CPT

## 2024-12-16 PROCEDURE — 76830 TRANSVAGINAL US NON-OB: CPT

## 2025-02-04 ENCOUNTER — TELEPHONE (OUTPATIENT)
Dept: FAMILY MEDICINE | Facility: CLINIC | Age: 30
End: 2025-02-04
Payer: COMMERCIAL

## 2025-02-04 NOTE — TELEPHONE ENCOUNTER
Patient Quality Outreach    Patient is due for the following:   Cervical Cancer Screening - PAP Needed  Physical Preventive Adult Physical    Action(s) Taken:   Schedule a Adult Preventative    Type of outreach:    Sent letter.    Questions for provider review:    None           Arcelia Duarte, CMA

## 2025-02-04 NOTE — LETTER
February 4, 2025    To  Aidee Pope  [id number] [facility] [facility] [address]    Your team at [facility] cares about your health. We have reviewed your chart and based on our findings; we are making the following recommendations to better manage your health.     You are in particular need of attention regarding the following:     Schedule a primary care office visit with your provider for a Pap Smear to screen for Cervical Cancer.  PREVENTATIVE VISIT: Physical    If you have already completed these items, please contact the clinic via phone or   [facility] so your care team can review and update your records. Thank you for   choosing [facility] for your healthcare needs. For any questions,   concerns, or to schedule an appointment please contact our clinic.    Healthy Regards,      Your [facility] Care Team            Electronically signed

## 2025-06-02 ENCOUNTER — OFFICE VISIT (OUTPATIENT)
Dept: URGENT CARE | Facility: URGENT CARE | Age: 30
End: 2025-06-02
Payer: COMMERCIAL

## 2025-06-02 ENCOUNTER — ANCILLARY PROCEDURE (OUTPATIENT)
Dept: GENERAL RADIOLOGY | Facility: CLINIC | Age: 30
End: 2025-06-02
Attending: NURSE PRACTITIONER
Payer: COMMERCIAL

## 2025-06-02 VITALS
BODY MASS INDEX: 20.67 KG/M2 | SYSTOLIC BLOOD PRESSURE: 122 MMHG | HEART RATE: 75 BPM | WEIGHT: 113 LBS | RESPIRATION RATE: 16 BRPM | DIASTOLIC BLOOD PRESSURE: 74 MMHG | TEMPERATURE: 98 F | OXYGEN SATURATION: 100 %

## 2025-06-02 DIAGNOSIS — M79.674 PAIN OF TOE OF RIGHT FOOT: ICD-10-CM

## 2025-06-02 DIAGNOSIS — M79.674 PAIN OF TOE OF RIGHT FOOT: Primary | ICD-10-CM

## 2025-06-02 PROCEDURE — 73660 X-RAY EXAM OF TOE(S): CPT | Mod: TC | Performed by: STUDENT IN AN ORGANIZED HEALTH CARE EDUCATION/TRAINING PROGRAM

## 2025-06-02 PROCEDURE — 3078F DIAST BP <80 MM HG: CPT | Performed by: NURSE PRACTITIONER

## 2025-06-02 PROCEDURE — 99214 OFFICE O/P EST MOD 30 MIN: CPT | Performed by: NURSE PRACTITIONER

## 2025-06-02 PROCEDURE — 3074F SYST BP LT 130 MM HG: CPT | Performed by: NURSE PRACTITIONER

## 2025-06-02 NOTE — PROGRESS NOTES
SUBJECTIVE:   Aidee Pope is a 29 year old female presenting with a chief complaint of   Chief Complaint   Patient presents with    Foot Pain     Right foot pain, mostly right great toe and under the foot, slipped on mud last Friday   Was wearing crocs when walking.     Past Medical History:   Diagnosis Date    Chickenpox      Family History   Problem Relation Age of Onset    Psychotic Disorder Father 52        suicide    Alcoholism Father     Alzheimer Disease Maternal Grandmother     Heart Disease Maternal Grandfather      Current Outpatient Medications   Medication Sig Dispense Refill    MAGNESIUM PO Take by mouth daily.       Social History     Tobacco Use    Smoking status: Never    Smokeless tobacco: Never   Substance Use Topics    Alcohol use: Not Currently     Comment: occas-quit with pregnancy       OBJECTIVE  /74   Pulse 75   Temp 98  F (36.7  C) (Tympanic)   Resp 16   Wt 51.3 kg (113 lb)   SpO2 100%   BMI 20.67 kg/m      Physical Exam  Musculoskeletal:        Feet:    Feet:      Comments: Right great toe is swollen, pain increased with movement and weight bearing.   Pain with palpation to 1st metatarsal phalangeal joint of great toe worse on plantar surface.      Right great toe xray: I have personally ordered and preliminarily reviewed the following xray, I have noted no fracture.      ASSESSMENT:    1. Pain of toe of right foot (Primary)    - XR Toe Right G/E 2 Views; Future  - diclofenac (VOLTAREN) 1 % topical gel; Apply 2 g topically 4 times daily.  Dispense: 50 g; Refill: 1      PLAN:  Tylenol or ibuprofen for pain.  Ice off and on for the next couple days.  Massage the area with Voltaren gel 4 times a day as needed for pain.  Supportive shoes until this improves.  Be seen again if this is not improving as expected in then next 7-10 days.

## 2025-06-02 NOTE — PROGRESS NOTES
[facility]gent [facility] Clinic Visit    Chief Complaint   Patient presents with    Foot Pain     Right foot pain, mostly right great toe and under the foot, slipped on mud last Friday 6/2/2025    11:38 AM   Additional Questions   Roomed by Flower Pope

## 2025-06-02 NOTE — PATIENT INSTRUCTIONS
Tylenol or ibuprofen for pain.  Ice off and on for the next couple days.  Massage the area with Voltaren gel 4 times a day as needed for pain.  Supportive shoes until this improves.  Be seen again if this is not improving as expected in then next 7-10 days.

## (undated) DEVICE — SU PLAIN 3-0 CT 27" 852H

## (undated) DEVICE — GLOVE PROTEXIS BLUE W/NEU-THERA 6.5  2D73EB65

## (undated) DEVICE — SU MONOCRYL 4-0 PS-2 18" UND Y496G

## (undated) DEVICE — PREP CHLORAPREP 26ML TINTED ORANGE  260815

## (undated) DEVICE — SOL WATER IRRIG 1000ML BOTTLE 07139-09

## (undated) DEVICE — LABEL MEDICATION SYSTEM  3304

## (undated) DEVICE — CATH TRAY FOLEY SURESTEP 16FR W/URINE MTR STATLK LF A303416A

## (undated) DEVICE — STOCKING SLEEVE COMPRESSION CALF MED

## (undated) DEVICE — SU VICRYL 0 CT-1 36" J946H

## (undated) DEVICE — SU MONOCRYL 0 CT-1 27" Y340H

## (undated) DEVICE — PACK C-SECTION LF PL15OTA83B

## (undated) DEVICE — GLOVE PROTEXIS W/NEU-THERA 6.0  2D73TE60

## (undated) DEVICE — SOL NACL 0.9% IRRIG 1000ML BOTTLE 07138-09

## (undated) DEVICE — Device

## (undated) DEVICE — ESU PENCIL W/COATED BLADE E2450H

## (undated) DEVICE — DRAPE SHEET REV FOLD 3/4 9349

## (undated) DEVICE — SUCTION MANIFOLD NEPTUNE 2 SYS 1 PORT 702-025-000

## (undated) DEVICE — ADH SKIN CLOSURE PREMIERPRO EXOFIN 1.0ML 3470

## (undated) RX ORDER — DEXAMETHASONE SODIUM PHOSPHATE 4 MG/ML
INJECTION, SOLUTION INTRA-ARTICULAR; INTRALESIONAL; INTRAMUSCULAR; INTRAVENOUS; SOFT TISSUE
Status: DISPENSED
Start: 2022-09-01

## (undated) RX ORDER — MORPHINE SULFATE 1 MG/ML
INJECTION, SOLUTION EPIDURAL; INTRATHECAL; INTRAVENOUS
Status: DISPENSED
Start: 2022-09-01

## (undated) RX ORDER — KETOROLAC TROMETHAMINE 30 MG/ML
INJECTION, SOLUTION INTRAMUSCULAR; INTRAVENOUS
Status: DISPENSED
Start: 2022-09-01

## (undated) RX ORDER — ONDANSETRON 2 MG/ML
INJECTION INTRAMUSCULAR; INTRAVENOUS
Status: DISPENSED
Start: 2022-09-01

## (undated) RX ORDER — OXYTOCIN/0.9 % SODIUM CHLORIDE 30/500 ML
PLASTIC BAG, INJECTION (ML) INTRAVENOUS
Status: DISPENSED
Start: 2022-09-01